# Patient Record
Sex: FEMALE | Race: WHITE | Employment: OTHER | URBAN - METROPOLITAN AREA
[De-identification: names, ages, dates, MRNs, and addresses within clinical notes are randomized per-mention and may not be internally consistent; named-entity substitution may affect disease eponyms.]

---

## 2018-03-20 PROBLEM — F33.9 RECURRENT DEPRESSION (HCC): Status: ACTIVE | Noted: 2018-03-20

## 2018-05-08 ENCOUNTER — HOSPITAL ENCOUNTER (OUTPATIENT)
Dept: CT IMAGING | Age: 58
Discharge: HOME | End: 2018-05-08
Attending: INTERNAL MEDICINE
Payer: COMMERCIAL

## 2018-05-08 DIAGNOSIS — S09.90XD CLOSED HEAD INJURY, SUBSEQUENT ENCOUNTER: ICD-10-CM

## 2018-05-08 PROCEDURE — 70450 CT HEAD/BRAIN W/O DYE: CPT

## 2018-05-24 PROBLEM — G25.81 RLS (RESTLESS LEGS SYNDROME): Status: ACTIVE | Noted: 2018-05-24

## 2018-05-24 PROBLEM — G24.3 CERVICAL DYSTONIA: Status: ACTIVE | Noted: 2018-05-24

## 2018-05-24 PROBLEM — R25.1 TREMOR: Status: ACTIVE | Noted: 2018-05-24

## 2018-05-24 PROBLEM — G47.52 RBD (REM BEHAVIORAL DISORDER): Status: ACTIVE | Noted: 2018-05-24

## 2018-05-30 ENCOUNTER — HOSPITAL ENCOUNTER (OUTPATIENT)
Dept: MAMMOGRAPHY | Age: 58
Discharge: HOME | End: 2018-05-30
Attending: INTERNAL MEDICINE
Payer: COMMERCIAL

## 2018-05-30 DIAGNOSIS — N64.4 BREAST PAIN, RIGHT: ICD-10-CM

## 2018-05-30 PROCEDURE — 76642 ULTRASOUND BREAST LIMITED: CPT

## 2018-05-30 PROCEDURE — 77066 DX MAMMO INCL CAD BI: CPT

## 2020-04-08 PROBLEM — G25.69 TICS OF ORGANIC ORIGIN: Status: ACTIVE | Noted: 2020-04-08

## 2020-05-12 PROBLEM — N61.0 MASTITIS IN FEMALE: Status: ACTIVE | Noted: 2018-08-20

## 2020-05-12 PROBLEM — R52 BODY ACHES: Status: ACTIVE | Noted: 2017-03-26

## 2020-07-10 ENCOUNTER — HOSPITAL ENCOUNTER (OUTPATIENT)
Dept: GENERAL RADIOLOGY | Age: 60
Discharge: HOME | End: 2020-07-10
Attending: INTERNAL MEDICINE
Payer: COMMERCIAL

## 2020-07-10 DIAGNOSIS — R13.19 ESOPHAGEAL DYSPHAGIA: ICD-10-CM

## 2020-07-10 PROCEDURE — 74011000255 HC RX REV CODE- 255: Performed by: INTERNAL MEDICINE

## 2020-07-10 PROCEDURE — 74240 X-RAY XM UPR GI TRC 1CNTRST: CPT

## 2020-07-10 RX ADMIN — BARIUM SULFATE 135 ML: 980 POWDER, FOR SUSPENSION ORAL at 08:50

## 2020-07-10 RX ADMIN — BARIUM SULFATE 355 ML: 0.6 SUSPENSION ORAL at 08:50

## 2021-06-30 ENCOUNTER — APPOINTMENT (OUTPATIENT)
Dept: PHYSICAL THERAPY | Age: 61
End: 2021-06-30
Attending: PSYCHIATRY & NEUROLOGY

## 2021-07-02 ENCOUNTER — HOSPITAL ENCOUNTER (OUTPATIENT)
Dept: MRI IMAGING | Age: 61
Discharge: HOME | End: 2021-07-02
Attending: PSYCHIATRY & NEUROLOGY
Payer: COMMERCIAL

## 2021-07-02 DIAGNOSIS — H93.13 TINNITUS OF BOTH EARS: ICD-10-CM

## 2021-07-02 DIAGNOSIS — R29.2 BABINSKI SIGN POSITIVE IN RIGHT FOOT: ICD-10-CM

## 2021-07-02 DIAGNOSIS — R26.89 IMBALANCE: ICD-10-CM

## 2021-07-02 PROCEDURE — 74011636320 HC RX REV CODE- 636/320: Performed by: PSYCHIATRY & NEUROLOGY

## 2021-07-02 PROCEDURE — A9576 INJ PROHANCE MULTIPACK: HCPCS | Performed by: PSYCHIATRY & NEUROLOGY

## 2021-07-02 PROCEDURE — 70553 MRI BRAIN STEM W/O & W/DYE: CPT

## 2021-07-02 RX ORDER — SODIUM CHLORIDE 0.9 % (FLUSH) 0.9 %
10 SYRINGE (ML) INJECTION
Status: COMPLETED | OUTPATIENT
Start: 2021-07-02 | End: 2021-07-02

## 2021-07-02 RX ADMIN — Medication 10 ML: at 20:03

## 2021-07-02 RX ADMIN — GADOTERIDOL 21 ML: 279.3 INJECTION, SOLUTION INTRAVENOUS at 20:03

## 2021-07-07 ENCOUNTER — HOSPITAL ENCOUNTER (OUTPATIENT)
Dept: PHYSICAL THERAPY | Age: 61
Discharge: HOME | End: 2021-07-07
Attending: PSYCHIATRY & NEUROLOGY
Payer: COMMERCIAL

## 2021-07-07 PROCEDURE — 97162 PT EVAL MOD COMPLEX 30 MIN: CPT

## 2021-07-07 PROCEDURE — 97112 NEUROMUSCULAR REEDUCATION: CPT

## 2021-07-07 NOTE — THERAPY EVALUATION
Addy Estevez  : 1960  Primary: Joaquim Gómez  Secondary:  [address] at [place] [address] [facility], [address] [place] [address],[address], [place] [address].  [phone]   [phone]          Mary Guadalupe Assessment 2021   ICD-10: Treatment Diagnosis:     Dizziness and giddiness (R42)   Other abnormalities of gait and mobility (R26.89)   Precautions/Allergies:   Adhesive tape-silicones and Pcn [penicillins]   TREATMENT PLAN:  Effective Dates: 2021 TO 10/5/2021 (90 days). Frequency/Duration: 2 times a week for 90 Day(s) MEDICAL/REFERRING DIAGNOSIS:  Other abnormalities of gait and mobility [R26.89]   DATE OF ONSET: Chronic  REFERRING PHYSICIAN: Fiona Matthews MD Orders: Evaluate and Treat  Return MD Appointment: TBD     INITIAL ASSESSMENT:  Ms. Eric Mcguire presents with decreased mobility, decreased strength, and decreased safety with ambulation secondary to degenerative changes. After discussing with patient, she agreed she would benefit from physical therapy to improve above deficits. Please sign this plan of treatment if you concur. Thank you for the opportunity to serve this patient. PROBLEM LIST (Impacting functional limitations):  1. Decreased Strength  2. Decreased ADL/Functional Activities  3. Decreased Ambulation Ability/Technique  4. Decreased Balance  5. Decreased Activity Tolerance INTERVENTIONS PLANNED: (Treatment may consist of any combination of the following)  1. Balance Exercise  2. Gait Training  3. Home Exercise Program (HEP)  4. Neuromuscular Re-education/Strengthening  5. Range of Motion (ROM)  6. Therapeutic Activites  7. Therapeutic Exercise/Strengthening     GOALS: (Goals have been discussed and agreed upon with patient.)  Short-Term Functional Goals: Time Frame: 30 days  1. Patient will be independent with home exercise program without exacerbation of symptoms or cueing needed.   Discharge Goals: Time Frame: 90 days  1. Patient will be independent with all ADLs with minimal fear of falling and loss of balance with daily tasks. 2. Patient will report no fear avoidance with social or recreational activities due to fear of falling. 3. Patient will score greater than or equal to 50/56 on Bashir Balance Scale with minimal effect of imbalance on patient's ability to manage every day life activities. 4. Patient will score greater than or equal to 25/30 on Functional Gait Assessment with minimal effect of imbalance on patient's ability to manage every day life activities. OUTCOME MEASURE:   Tool Used: Bashir Balance Scale  Score:  Initial: 45/56 Most Recent: X/56 (Date: -- )   Interpretation of Score: Each section is scored on a 0-4 scale, 0 representing the patients inability to perform the task and 4 representing independence. The scores of each section are added together for a total score of 56. The higher the patients score, the more independent the patient is. Any score below 45 indicates increased risk for falls. Tool Used: Functional Gait Assessment  Score:  Initial: 18/30  Most Recent: X/30 (Date: -- )   Interpretation of Score: This test is a modification of the Dynamic Gait Index. It is a 10 item test with each item score 0-3 that assesses postural stability during various walking tasks. MEDICAL NECESSITY:   · Patient is expected to demonstrate progress in strength, range of motion, balance, coordination and functional technique to improve safety during daily activities. · Patient demonstrates good rehab potential due to higher previous functional level. · Skilled intervention continues to be required due to decreased functional mobility. REASON FOR SERVICES/OTHER COMMENTS:  · Patient continues to demonstrate capacity to improve overall functional mobility which will increase independence and increase safety.   Total Duration:  PT Patient Time In/Time Out  Time In: 1100  Time Out: Taty Camara 148 Potential For Stated Goals: Good  Regarding [de-identified] M Ploof's therapy, I certify that the treatment plan above will be carried out by a therapist or under their direction. Thank you for this referral,  Selene Mishra PT     Referring Physician Signature: Bridgett Reed,* _______________________________ Date _____________      PAIN/SUBJECTIVE:   Initial: Pain Intensity 1: 0  Post Session:  0/10   HISTORY:   History of Injury/Illness (Reason for Referral):  Patient reports she has been having balance issues for a while now. She reports she started out having pain in her leg and groin in her left leg. She reports that the pain is gone now, but she was scared she had had a CVA or has MS. She reports she has not fallen, but has just been off balance with ambulation at times. She reports she can't stand on her left leg to lift her right leg because she doesn't feel it will hold her. She reports that she also has some ringing in her ears that she feels is throwing off her balance as well. She reports she would like to work to improve her overall balance and mobility. Past Medical History/Comorbidities:   Ms. Emelina Marie  has a past medical history of Chronic obstructive pulmonary disease ([facility] [address] Dystonia, H/O gastric bypass (12/9/2013 at [facility]), diabetes mellitus, Liver cirrhosis secondary to JEAN ([facility] [address] Obese, Parkinson's disease ([facility] [address] (dx--2010), RBD (REM behavioral disorder) (5/24/2018), RLS (restless legs syndrome), Thromboembolus ([facility] [address] (2008), Unspecified adverse effect of anesthesia (3/2014 at [facility]), and Unspecified sleep apnea. She also has no past medical history of Difficult intubation, Malignant hyperthermia due to anesthesia, Nausea & vomiting, or Pseudocholinesterase deficiency. Ms. Emelina Marie  has a past surgical history that includes hx lap cholecystectomy (3/2013); hx gastric bypass (12/2013 at [facility]); hx hernia repair (6/2013); and hx other surgical (3/2014 at [facility]).   Social History/Living Environment:   Home Environment: Private residence  Living Alone: No  Support Systems: Family member(s), Friends \ neighbors   Prior Level of Function/Work/Activity:  Independent  Dominant Side:         RIGHT  Personal Factors:          Sex:  female        Age:  63 y.o. Ambulatory/Rehab Services H2 Model Falls Risk Assessment   Risk Factors:       (1)  Dizziness/Vertigo Ability to Rise from Chair:       (0)  Ability to rise in a single movement   Falls Prevention Plan:       No modifications necessary   Total: (5 or greater = High Risk): 1   ©2010 [facility]. All Rights Reserved. [facility] Patent #[id number]. Federal Law prohibits the replication, distribution or use without written permission from [facility] [employer]   Current Medications:       Current Outpatient Medications:     nystatin (MYCOSTATIN) powder, Apply twice daily, Disp: 60 g, Rfl: 2    mupirocin (BACTROBAN) 2 % ointment, Apply  to affected area daily. , Disp: 22 g, Rfl: 0    pantoprazole (Protonix) 40 mg tablet, Take 1 Tab by mouth daily. , Disp: 90 Tab, Rfl: 4    citalopram (CELEXA) 40 mg tablet, Take 1 Tab by mouth daily. , Disp: 90 Tab, Rfl: 1    guaiFENesin-dextromethorphan SR (Mucinex DM) 600-30 mg per tablet, Take 1 Tab by mouth two (2) times a day., Disp: 60 Tab, Rfl: 1    ipratropium-albuterol (COMBIVENT RESPIMAT)  mcg/actuation inhaler, Take 1 Puff by inhalation four (4) times daily. , Disp: 1 Inhaler, Rfl: 6   Date Last Reviewed:  7/7/2021    Number of Personal Factors/Comorbidities that affect the Plan of Care: 1-2: MODERATE COMPLEXITY   EXAMINATION:   Observation/Orthostatic Postural Assessment:          Posture Assessment: Rounded shoulders, Forward head   Functional Mobility:   Gait/Mobility:    · Base of Support: Center of gravity altered  · Speed/Jammie: Pace decreased (<100 feet/min)  · Step Length: Left shortened, Right shortened  · Swing Pattern: Left asymmetrical, Right asymmetrical  · Gait Abnormalities: Antalgic  · Ambulation - Level of Assistance: Independent  · Interventions: Verbal cues, Safety awareness training          Transfers:     · Sit to Stand: Modified independent  · Stand to Sit: Modified independent  · Stand Pivot Transfers: Modified independent  · Bed to Chair: Modified independent  · Lateral Transfers: Modified independent         Bed Mobility:     · Rolling: Independent  · Supine to Sit: Independent  · Sit to Supine: Independent  · Scooting: Independent       Strength:          UE STRENGTH: 4/5 shoulder flexion, 4/5 shoulder abduction, 4/5 shoulder extension, 4/5 elbow flexion, 4/5 elbow extension. LE STRENGTH:  3+/5 hip flexion, 3+/5 hip abduction, 3+/5 hip adduction, 3+/5 hip extension, 3+/5 knee extension, 3+/5 knee flexion, 2/5 ankle dorsiflexion, 2/5 ankle plantarflexion, 2/5 ankle inversion, 2/5 ankle eversion. Sensation:         Intact     Body Structures Involved:  1. Nerves  2. Muscles Body Functions Affected:  1. Neuromusculoskeletal  2. Movement Related Activities and Participation Affected:  1. Mobility  2.  Self Care   Number of elements (examined above) that affect the Plan of Care: 4+: HIGH COMPLEXITY   CLINICAL PRESENTATION:   Presentation: Evolving clinical presentation with changing clinical characteristics: MODERATE COMPLEXITY   CLINICAL DECISION MAKING:   Use of outcome tool(s) and clinical judgement create a POC that gives a: Questionable prediction of patient's progress: MODERATE COMPLEXITY

## 2021-07-07 NOTE — PROGRESS NOTES
Rloand Estevez  : 1960  Primary: [facility] [place] [facility]  Secondary:  [address] at [facility]  [address] [facility], Melani Narvaez, [place] [address] [place].  [phone]   [phone]        OUTPATIENT PHYSICAL THERAPY: Daily Treatment Note 2021  Visit Count:  1    ICD-10: Treatment Diagnosis:     Dizziness and giddiness (R42)   Other abnormalities of gait and mobility (R26.89)   Precautions/Allergies:   Adhesive tape-silicones and Pcn [penicillins]   TREATMENT PLAN:  Effective Dates: 2021 TO 10/5/2021 (90 days). Frequency/Duration: 2 times a week for 90 Day(s)    Pre-treatment Symptoms/Complaints:  2021: Patient reports she would like to no be so off balance. Pain: Initial: Pain Intensity 1: 0  Post Session:  0/10   Medications Last Reviewed:  2021  Updated Objective Findings:  See evaluation note from today  TREATMENT:     NEUROMUSCULAR RE-EDUCATION: (15 minutes):  Exercise/activities per grid below to improve balance, coordination, kinesthetic sense, posture and proprioception. Required minimal visual and verbal cues to promote static and dynamic balance in standing. Date:  2021   Activity/Exercise Parameters   Marching in hallway 4 laps  HEP   Side stepping in hallway 4 laps  HEP      Treatment/Session Summary:    · Response to Treatment:  Patient tolerated assessment without complaints of increased imbalance or fatigue. Patient verbalized and demonstrated understanding of HEP. · Communication/Consultation:  None today  · Equipment provided today:  None today  · Recommendations/Intent for next treatment session: Next visit will focus on improving overall mobility.     Total Treatment Billable Duration:  15 minutes + evaluation  PT Patient Time In/Time Out  Time In: 1100  Time Out: [facility], PT    Future Appointments   Date Time Provider Gladis Jackson   2021 10:15 AM Lillian Chavarria PT [facility] SFE   2021  9:30 AM Pérez Mulligan, PT [facility]PT [facility]   9/8/2021  2:30 PM Inez Pink MD BSNE BSNE

## 2021-07-12 ENCOUNTER — HOSPITAL ENCOUNTER (OUTPATIENT)
Dept: PHYSICAL THERAPY | Age: 61
Discharge: HOME | End: 2021-07-12
Attending: PSYCHIATRY & NEUROLOGY
Payer: COMMERCIAL

## 2021-07-12 PROCEDURE — 97112 NEUROMUSCULAR REEDUCATION: CPT

## 2021-07-12 NOTE — PROGRESS NOTES
John Paul Lund Zenaida  : 1960  Primary: Shin Gómez  Secondary:  [address] at [place] [address] [facility], [address] [place] [address],[address], [place] [address].  [phone]   [phone]        OUTPATIENT PHYSICAL THERAPY: Daily Treatment Note 2021  Visit Count:  2    ICD-10: Treatment Diagnosis:     Dizziness and giddiness (R42)   Other abnormalities of gait and mobility (R26.89)   Precautions/Allergies:   Adhesive tape-silicones and Pcn [penicillins]   TREATMENT PLAN:  Effective Dates: 2021 TO 10/5/2021 (90 days). Frequency/Duration: 2 times a week for 90 Day(s)    Pre-treatment Symptoms/Complaints:  2021: Patient reports she is doing okay. Pain: Initial: Pain Intensity 1: 0  Post Session:  0/10   Medications Last Reviewed:  2021  Updated Objective Findings:  None Today  TREATMENT:     NEUROMUSCULAR RE-EDUCATION: (45 minutes):  Exercise/activities per grid below to improve balance, coordination, kinesthetic sense, posture and proprioception. Required minimal visual and verbal cues to promote static and dynamic balance in standing.     Balance/Vestibular Treatment:   Activity   Date  21 Date Date Date Date Date   Activity/Exercise   Sets/reps/equipment Sets/reps/  equipment Sets/reps/  equipment Sets/reps/  equipment Sets/reps/  equipment Sets/reps/  equipment   Walking with head turns     4 laps        Walking with head up & down     4 laps        Step ups             Step taps     6 inch 15 reps bilateral         Marching   4 laps        Sidestepping   4 laps        Crossovers           [place]           Walking  backwards             Tandem walking   4 laps        Weaving in/out of cones     4 laps        Picking up cones             Stepping over half foam   Forward 10 reps bilateral         Ball toss           Kick ball           Figure 8s            Circles right/left           Walking with 360 degree turns           Spirals           Weight shifting:    Left & Right             Weight shifting: Forward & Backward              Static Standing Balance     [place] eyes open  and eyes closed        Standing with feet apart             Standing with feet together     Eyes open and eyes closed        Standing with feet semitandem   Blue foam eyes open        Standing with feet tandem           Single leg stance           X1/X2 Viewing exercises             Hallpike-Kalpesh testing for BPPV (Benign Paroxysmal Positional Vertigo)             Lange-Daroff exercises           Canalith Repositioning treatment/Epley Maneuver  for BPPV (Benign Paroxysmal Positional Vertigo)           Smart Equitest Training: See scanned report. Treatment/Session Summary:    · Response to Treatment:  Patient needed several rest breaks due to increased fatigue. Added standing feet together eyes closed to home exercise program.  · Communication/Consultation:  None today  · Equipment provided today:  None today  · Recommendations/Intent for next treatment session: Next visit will focus on improving overall mobility.     Total Treatment Billable Duration:  45 minutes   PT Patient Time In/Time Out  Time In: 1015  Time Out: [address], PT    Future Appointments   Date Time Provider Gladis Jackson   7/12/2021  2:00 PM [facility]   7/14/2021  9:30 AM Zac Brar PT SFE[facility]PT SFE   7/20/2021  8:45 AM Rudi Dumas, PT SFEORPT SFE   7/23/2021 11:00 AM Zac Brar PT SFEORPT SFE   9/8/2021  2:30 PM Suman Kwok MD BSNE BSNE

## 2021-07-14 ENCOUNTER — HOSPITAL ENCOUNTER (OUTPATIENT)
Dept: PHYSICAL THERAPY | Age: 61
Discharge: HOME | End: 2021-07-14
Attending: PSYCHIATRY & NEUROLOGY
Payer: COMMERCIAL

## 2021-07-14 PROCEDURE — 97112 NEUROMUSCULAR REEDUCATION: CPT

## 2021-07-14 NOTE — PROGRESS NOTES
Osborn Castleman Ploof  : 1960  Primary: [facility]  Secondary:  [address] at [place] [address] [facility], [address] [place] [address],[address], [facility].  [phone]   [phone]        OUTPATIENT PHYSICAL THERAPY: Daily Treatment Note 2021  Visit Count:  3    ICD-10: Treatment Diagnosis:     Dizziness and giddiness (R42)   Other abnormalities of gait and mobility (R26.89)   Precautions/Allergies:   Adhesive tape-silicones and Pcn [penicillins]   TREATMENT PLAN:  Effective Dates: 2021 TO 10/5/2021 (90 days). Frequency/Duration: 2 times a week for 90 Day(s)    Pre-treatment Symptoms/Complaints:  2021: Patient reports she is tired of feeling this way. Pain: Initial: Pain Intensity 1: 0  Post Session:  0/10   Medications Last Reviewed:  2021  Updated Objective Findings:  None Today  TREATMENT:     NEUROMUSCULAR RE-EDUCATION: (30 minutes):  Exercise/activities per grid below to improve balance, coordination, kinesthetic sense, posture and proprioception. Required minimal visual and verbal cues to promote static and dynamic balance in standing.     Balance/Vestibular Treatment:   Activity   Date  2021   Activity/Exercise   Sets/reps/equipment   Hallpike-Kalpesh Positive on Right   Epley Maneuver 1 bout with head turned right in starting position - nystagmus noted in all three positions - patient could not tolerate treatment again today   - verbal instruction given to rest today in an upright position   Walking with head turns     Not today:  4 laps   Walking with head up & down     Not today:  4 laps   Step ups        Step taps     Not today:  6 inch   15 reps   Marching   Not today:  4 laps   Sidestepping   Not today:  4 laps   Crossovers      [place]      Walking  backwards        Tandem walking   Not today:  4 laps   Weaving in/out of cones     Not today:  4 laps   Picking up cones        Stepping over half foam   Not today:  10 reps forward  B LE   Ball toss Kick ball      Figure 8s       Circles right/left      Walking with 360 degree turns      Spirals      Weight shifting:    Left & Right        Weight shifting: Forward & Backward         Static Standing Balance     Not today:  [place] eyes open  and eyes closed   Standing with feet apart        Standing with feet together     Not today:  Eyes open and eyes closed   Standing with feet semitandem   Not today:  Blue foam eyes open   Standing with feet tandem      Single leg stance          Treatment/Session Summary:    · Response to Treatment:  Patient tolerated Hallpike-Nedrow and Epley maneuver with reports of nausea with sympathetic nervous system response of increased body temperature. Only one bout of Epley attempted today per patients symptoms as well as request.  Will re-assess next visit. · Communication/Consultation:  None today  · Equipment provided today:  None today  · Recommendations/Intent for next treatment session: Next visit will focus on improving overall mobility.     Total Treatment Billable Duration:  45 minutes   PT Patient Time In/Time Out  Time In: 0930  Time Out: [address], PT    Future Appointments   Date Time Provider Gladis Jackson   7/20/2021  8:45 AM Isa Duong, PT [facility] SFE   7/23/2021 11:00 AM Angel Yun, PT [facility] SFE   9/8/2021  2:30 PM Roswell Ormond, MD [facility] BSNE

## 2021-07-20 ENCOUNTER — HOSPITAL ENCOUNTER (OUTPATIENT)
Dept: PHYSICAL THERAPY | Age: 61
Discharge: HOME | End: 2021-07-20
Attending: PSYCHIATRY & NEUROLOGY
Payer: COMMERCIAL

## 2021-07-20 PROCEDURE — 97112 NEUROMUSCULAR REEDUCATION: CPT

## 2021-07-20 NOTE — PROGRESS NOTES
Rajeshpaul Arshadoof  : 1960  Primary: [facility]  Secondary:  [address] at [place] [id number] [facility], [address] [place] [address],[address], [place] [address].  [phone]   [phone]        OUTPATIENT PHYSICAL THERAPY: Daily Treatment Note 2021  Visit Count:  4    ICD-10: Treatment Diagnosis:     Dizziness and giddiness (R42)   Other abnormalities of gait and mobility (R26.89)   Precautions/Allergies:   Adhesive tape-silicones and Pcn [penicillins]   TREATMENT PLAN:  Effective Dates: 2021 TO 10/5/2021 (90 days). Frequency/Duration: 2 times a week for 90 Day(s)    Pre-treatment Symptoms/Complaints:  2021: \"I think I overdid it yesterday with my friend. We went out to eat and shopping. I am feeling really tired today\". Pain: Initial: Pain Intensity 1: 0  Post Session:  0/10   Medications Last Reviewed:  2021  Updated Objective Findings:  None Today  TREATMENT:     NEUROMUSCULAR RE-EDUCATION: (45 minutes):  Exercise/activities per grid below to improve balance, coordination, kinesthetic sense, posture and proprioception. Required minimal visual and verbal cues to promote static and dynamic balance in standing. Balance/Vestibular Treatment:   Activity   Date  2021   Activity/Exercise   Sets/reps/equipment   Hallpike-Kalpesh X   Epley Maneuver X   Walking with head turns     4 laps   Walking with head up & down     4 laps   Step ups        Step taps     6 inch   2x10 reps   Marching   4 laps   Sidestepping   4 laps   Crossovers      [place]      Walking  backwards        Tandem walking   4 laps   Weaving in/out of cones     4 laps   Picking up cones        Stepping over half foam   5 reps forward  B LE   Ball toss      Kick ball      Figure 8s       Circles right/left      Walking with 360 degree turns      Spirals      Weight shifting:    Left & Right        Weight shifting:   Forward & Backward         Static Standing Balance     X   Standing with feet apart Standing with feet together     X   Standing with feet semitandem   X   Standing with feet tandem      Single leg stance          Treatment/Session Summary:    · Response to Treatment:  Patient did not want to be retested for BPPV. Patient was told  we would retest later if vertigo returns. Patient needed extended rest breaks due to increased fatigue. · Communication/Consultation:  None today  · Equipment provided today:  None today  · Recommendations/Intent for next treatment session: Next visit will focus on improving overall mobility.     Total Treatment Billable Duration:  45 minutes   PT Patient Time In/Time Out  Time In: 0845  Time Out: [address] PT    Future Appointments   Date Time Provider Gladis Jackson   7/23/2021 11:00 AM Roxanna Barajas, PT [facility] SFE   7/27/2021  9:30 AM Chitra Dumas, PT SFEORPT SFE   7/29/2021  2:30 PM Roxanna Barajas, PT SFE[facility]PT SFE   9/8/2021  2:30 PM Garry Car MD [facility] BSNE

## 2021-07-23 ENCOUNTER — HOSPITAL ENCOUNTER (OUTPATIENT)
Dept: PHYSICAL THERAPY | Age: 61
Discharge: HOME | End: 2021-07-23
Attending: PSYCHIATRY & NEUROLOGY
Payer: COMMERCIAL

## 2021-07-23 PROCEDURE — 97112 NEUROMUSCULAR REEDUCATION: CPT

## 2021-07-23 NOTE — PROGRESS NOTES
Lachelle Estevez  : 1960  Primary: [facility]  Secondary:  [address] at [place] [address] [facility], [address] [place] [address],[address], [place] [address].  [phone]   [phone]        OUTPATIENT PHYSICAL THERAPY: Daily Treatment Note 2021  Visit Count:  5    ICD-10: Treatment Diagnosis:     Dizziness and giddiness (R42)   Other abnormalities of gait and mobility (R26.89)   Precautions/Allergies:   Adhesive tape-silicones and Pcn [penicillins]   TREATMENT PLAN:  Effective Dates: 2021 TO 10/5/2021 (90 days). Frequency/Duration: 2 times a week for 90 Day(s)    Pre-treatment Symptoms/Complaints:  2021: Patient reports she is dizzy today and tired, but not having any of the spinning. Pain: Initial: Pain Intensity 1: 0  Post Session:  0/10   Medications Last Reviewed:  2021  Updated Objective Findings:  None Today  TREATMENT:     NEUROMUSCULAR RE-EDUCATION: (45 minutes):  Exercise/activities per grid below to improve balance, coordination, kinesthetic sense, posture and proprioception. Required minimal visual and verbal cues to promote static and dynamic balance in standing. Balance/Vestibular Treatment:   Activity   Date  2021   Activity/Exercise   Sets/reps/equipment   Hallpike-Kalpesh X   Epley Maneuver X   Walking with head turns     4 laps   Walking with head up & down     4 laps   Step ups        Step taps     6 inch   2x10 reps   Marching   4 laps   Sidestepping   4 laps   Crossovers      [place]      Walking  backwards        Tandem walking   4 laps   Weaving in/out of cones     4 laps   Picking up cones        Stepping over half foam   10 reps forward  B LE   Ball toss      Kick ball      Figure 8s       Circles right/left      Walking with 360 degree turns      Spirals      Weight shifting:    Left & Right        Weight shifting:   Forward & Backward         Static Standing Balance     X   Standing with feet apart        Standing with feet together Level ground  5 seconds eyes closed  3 reps   Standing with feet semitandem   Level ground  5 seconds eyes closed  3 reps each direction   Standing with feet tandem      Single leg stance          Treatment/Session Summary:    · Response to Treatment:  Patient tolerated treatment well with improving overall mobility, but with several sitting rest breaks due to dizziness and/or fatigue. · Communication/Consultation:  None today  · Equipment provided today:  None today  · Recommendations/Intent for next treatment session: Next visit will focus on improving overall mobility.     Total Treatment Billable Duration:  45 minutes   PT Patient Time In/Time Out  Time In: 1100  Time Out: [facility], PT    Future Appointments   Date Time Provider Gladis Jackson   7/23/2021 11:00 AM Shabnam Loredo PT [facility] BLANCO   7/27/2021  9:30 AM Juan Dumas PT [facility]   7/29/2021  2:30 PM Shabnam Loredo, PT SFEORP[facility]OR[facility] SFE   9/8/2021  2:30 PM Breann Anaya MD [facility] BSNE

## 2021-07-27 ENCOUNTER — HOSPITAL ENCOUNTER (OUTPATIENT)
Dept: PHYSICAL THERAPY | Age: 61
Discharge: HOME | End: 2021-07-27
Attending: PSYCHIATRY & NEUROLOGY
Payer: COMMERCIAL

## 2021-07-27 PROCEDURE — 97112 NEUROMUSCULAR REEDUCATION: CPT

## 2021-07-27 NOTE — PROGRESS NOTES
Osborn Castleman Ploof  : 1960  Primary: [facility]  Secondary:  [address] at [address], [address] [place] [address],[address], [place] [address].  [phone]   [phone]        OUTPATIENT PHYSICAL THERAPY: Daily Treatment Note 2021  Visit Count:  6    ICD-10: Treatment Diagnosis:     Dizziness and giddiness (R42)   Other abnormalities of gait and mobility (R26.89)   Precautions/Allergies:   Adhesive tape-silicones and Pcn [penicillins]   TREATMENT PLAN:  Effective Dates: 2021 TO 10/5/2021 (90 days). Frequency/Duration: 2 times a week for 90 Day(s)    Pre-treatment Symptoms/Complaints:  2021: Patient reports  was the worst day. \"I had a horrible headache\". Patient reports no dizziness right now. Pain: Initial: Pain Intensity 1: 3  Pain Location 1: Head, Neck  Post Session:  3/10   Medications Last Reviewed:  2021  Updated Objective Findings:  None Today  TREATMENT:     NEUROMUSCULAR RE-EDUCATION: (45 minutes):  Exercise/activities per grid below to improve balance, coordination, kinesthetic sense, posture and proprioception. Required minimal visual and verbal cues to promote static and dynamic balance in standing. Balance/Vestibular Treatment:   Activity   Date  2021   Activity/Exercise   Sets/reps/equipment   Hallpike-Bruni X   Epley Maneuver X   Walking with head turns     4 laps   Walking with head up & down     4 laps   Step ups        Step taps     6 inch   2x10 reps   Marching   4 laps   Sidestepping   4 laps   Crossovers      [place]      Walking  backwards        Tandem walking   4 laps   Weaving in/out of cones     4 laps   Picking up cones        Stepping over half foam   10 reps forward  B LE  10 reps lateral B LE   Ball toss      Kick ball      Figure 8s       Circles right/left      Walking with 360 degree turns      Spirals      Weight shifting:    Left & Right        Weight shifting:   Forward & Backward         Static Standing Balance     X   Standing with feet apart        Standing with feet together     Level ground  5 seconds eyes closed  3 reps   Standing with feet semitandem   Level ground  5 seconds eyes closed  3 reps each direction   Standing with feet tandem      Single leg stance          Treatment/Session Summary:    · Response to Treatment:  Patient reports increased dizziness walking with head turns. Patient needed several rest breaks due to dizziness. · Communication/Consultation:  None today  · Equipment provided today:  None today  · Recommendations/Intent for next treatment session: Next visit will focus on improving overall mobility.     Total Treatment Billable Duration:  45 minutes   PT Patient Time In/Time Out  Time In: 0930  Time Out: Reynaldo De La Cruz, PT    Future Appointments   Date Time Provider Gladis Jackson   7/29/2021  2:30 PM Almon Sample, PT [facility] SFE   8/3/2021  2:30 PM Almon Sample, PT S[facility]PT SFE   8/5/2021  4:45 PM Almon Sample, PT [facility] SFE   8/10/2021 11:00 AM Almon Sample, PT [facility] SFE   8/17/2021 11:00 AM Almon Sample, PT [facility] SFE   8/24/2021 11:00 AM Almon Sample, PT [facility] SFE   8/26/2021 10:15 AM Almon Sample, PT [facility] SFE   8/31/2021 11:00 AM Almon Sample, PT S[facility]PT SFE   9/8/2021  2:30 PM Inez Pink MD BSNE BSNE

## 2021-07-29 ENCOUNTER — HOSPITAL ENCOUNTER (OUTPATIENT)
Dept: PHYSICAL THERAPY | Age: 61
Discharge: HOME | End: 2021-07-29
Attending: PSYCHIATRY & NEUROLOGY
Payer: COMMERCIAL

## 2021-07-29 PROCEDURE — 97112 NEUROMUSCULAR REEDUCATION: CPT

## 2021-07-29 NOTE — PROGRESS NOTES
Elizabeth Jara Ploof  : 1960  Primary: [facility]  Secondary:  [address] at [place] [address] [facility], [address] [place] [address],[address], [facility].  [phone]   [phone]        OUTPATIENT PHYSICAL THERAPY: Daily Treatment Note 2021  Visit Count:  7    ICD-10: Treatment Diagnosis:     Dizziness and giddiness (R42)   Other abnormalities of gait and mobility (R26.89)   Precautions/Allergies:   Adhesive tape-silicones and Pcn [penicillins]   TREATMENT PLAN:  Effective Dates: 2021 TO 10/5/2021 (90 days). Frequency/Duration: 2 times a week for 90 Day(s)    Pre-treatment Symptoms/Complaints:  2021: Patient reports she is feeling good today. Pain: Initial: Pain Intensity 1: 0  Post Session:  3/10   Medications Last Reviewed:  2021  Updated Objective Findings:  None Today  TREATMENT:     NEUROMUSCULAR RE-EDUCATION: (45 minutes):  Exercise/activities per grid below to improve balance, coordination, kinesthetic sense, posture and proprioception. Required minimal visual and verbal cues to promote static and dynamic balance in standing. Balance/Vestibular Treatment:   Activity   Date  2021   Activity/Exercise   Sets/reps/equipment   Hallpike-N[place]ack X   Epley Maneuver X   Walking with head turns     4 laps   Walking with head up & down     4 laps   Step ups        Step taps     6 inch   2x10 reps   Marching   4 laps   Sidestepping   4 laps   Crossovers      [place]      Walking  backwards        Tandem walking   4 laps   Weaving in/out of cones     4 laps   Picking up cones        Stepping over half foam   10 reps forward  B LE  10 reps lateral B LE   Ball toss      Kick ball      Figure 8s       Circles right/left      Walking with 360 degree turns      Spirals      Weight shifting:    Left & Right        Weight shifting:   Forward & Backward         Static Standing Balance     X   Standing with feet apart        Standing with feet together     Level ground  5 seconds eyes closed  3 reps   Standing with feet semitandem   Level ground  5 seconds eyes closed  3 reps each direction   Standing with feet tandem      Single leg stance          Treatment/Session Summary:    · Response to Treatment:  Patient tolerated treatment well with improved mobility and balance with activities today. · Communication/Consultation:  None today  · Equipment provided today:  None today  · Recommendations/Intent for next treatment session: Next visit will focus on improving overall mobility.     Total Treatment Billable Duration:  45 minutes   PT Patient Time In/Time Out  Time In: 1430  Time Out: [address] [facility], PT    Future Appointments   Date Time Provider Gladis Jackson   7/29/2021  2:30 PM Angel Yun, PT [facility] SFE   8/3/2021  2:30 PM Angel Yun, PT S[facility]PT SFE   8/5/2021  4:45 PM Angel Yun, PT [facility]PT SFE   8/10/2021 11:00 AM Angel Yun, PT [facility] SFE   8/17/2021 11:00 AM Angel Yun, PT [facility] SFE   8/24/2021 11:00 AM Angel Court, PT [facility]PT SFE   8/26/2021 10:15 AM Angel Yun, PT [facility]PT SFE   8/31/2021 11:00 AM Angel Court, PT S[facility]PT SFE   9/8/2021  2:30 PM Roswell Ormond, MD BSNE BSNE

## 2021-08-03 ENCOUNTER — HOSPITAL ENCOUNTER (OUTPATIENT)
Dept: PHYSICAL THERAPY | Age: 61
Discharge: HOME | End: 2021-08-03
Attending: PSYCHIATRY & NEUROLOGY
Payer: COMMERCIAL

## 2021-08-03 PROCEDURE — 97112 NEUROMUSCULAR REEDUCATION: CPT

## 2021-08-05 ENCOUNTER — HOSPITAL ENCOUNTER (OUTPATIENT)
Dept: PHYSICAL THERAPY | Age: 61
Discharge: HOME | End: 2021-08-05
Attending: PSYCHIATRY & NEUROLOGY
Payer: COMMERCIAL

## 2021-08-05 PROCEDURE — 97112 NEUROMUSCULAR REEDUCATION: CPT

## 2021-08-05 NOTE — PROGRESS NOTES
Tito Galan Ploof  : 1960  Primary: Isabel Dorsey [facility]  Secondary:  [address] at [place] [address] [facility], [address] [place] [address],[address], [address] [facility]  [phone]   [phone]        OUTPATIENT PHYSICAL THERAPY: Daily Treatment Note 2021  Visit Count:  9    ICD-10: Treatment Diagnosis:     Dizziness and giddiness (R42)   Other abnormalities of gait and mobility (R26.89)   Precautions/Allergies:   Adhesive tape-silicones and Pcn [penicillins]   TREATMENT PLAN:  Effective Dates: 2021 TO 10/5/2021 (90 days). Frequency/Duration: 2 times a week for 90 Day(s)    Pre-treatment Symptoms/Complaints:  2021: Patient reports she is ok right now, but is sometimes dizzy after treatment sessions. Pain: Initial: Pain Intensity 1: 0  Post Session:  0/10   Medications Last Reviewed:  2021  Updated Objective Findings:  None Today  TREATMENT:     NEUROMUSCULAR RE-EDUCATION: (45 minutes):  Exercise/activities per grid below to improve balance, coordination, kinesthetic sense, posture and proprioception. Required minimal visual and verbal cues to promote static and dynamic balance in standing. Balance/Vestibular Treatment:   Activity   Date  2021   Activity/Exercise   Sets/reps/equipment   Walking with head turns     4 laps   Walking with head up & down     4 laps   Step ups        Step taps     6 inch   2x10 reps   Marching   4 laps   Sidestepping   4 laps   Crossovers      [place]      Walking  backwards        Tandem walking   4 laps   Weaving in/out of cones     4 laps   Picking up cones        Stepping over half foam   10 reps forward  B LE  10 reps lateral B LE   Ball toss      Kick ball      Figure 8s       Circles right/left      Walking with 360 degree turns      Spirals      Weight shifting:    Left & Right        Weight shifting:   Forward & Backward         Static Standing Balance     X   Standing with feet apart        Standing with feet together     Blue foams  5 seconds eyes closed  3 reps   Standing with feet semitandem   Level ground  5 seconds eyes closed  3 reps each direction   Standing with feet tandem      Single leg stance          Treatment/Session Summary:    · Response to Treatment:  Patient tolerated treatment well with improving overall mobility and balance. · Communication/Consultation:  None today  · Equipment provided today:  None today  · Recommendations/Intent for next treatment session: Next visit will focus on improving overall mobility.     Total Treatment Billable Duration:  45 minutes   PT Patient Time In/Time Out  Time In: 8125  Time Out: [address], PT    Future Appointments   Date Time Provider Gladis Jackson   8/5/2021  4:45 PM Jasmyne Patiño, PT [facility] SFE   8/10/2021 11:00 AM Jasmyne Patiño, PT S[facility]PT SFE   8/18/2021  9:30 AM Jasmyne Patiño, PT S[facility]PT SFE   8/24/2021 11:00 AM Jasmyne Patiño, PT S[facility]PT SFE   8/26/2021 10:15 AM Jasmyne Patiño, PT [facility]PT SFE   8/31/2021 11:00 AM Jasmyne Patiño, PT SFEORPT SFE   9/8/2021  2:30 PM Julissa Alanis MD BSNE BSNE

## 2021-08-10 ENCOUNTER — HOSPITAL ENCOUNTER (OUTPATIENT)
Dept: PHYSICAL THERAPY | Age: 61
Discharge: HOME | End: 2021-08-10
Attending: PSYCHIATRY & NEUROLOGY
Payer: COMMERCIAL

## 2021-08-10 PROCEDURE — 97112 NEUROMUSCULAR REEDUCATION: CPT

## 2021-08-10 NOTE — PROGRESS NOTES
Alicia Bonnie Estevez  : 1960  Primary: [facility]  Secondary:  [address] at [facility]  [address], [address], [address] [facility]  [phone]   [phone]        OUTPATIENT PHYSICAL THERAPY: Daily Treatment Note 8/10/2021  Visit Count:  10    ICD-10: Treatment Diagnosis:     Dizziness and giddiness (R42)   Other abnormalities of gait and mobility (R26.89)   Precautions/Allergies:   Adhesive tape-silicones and Pcn [penicillins]   TREATMENT PLAN:  Effective Dates: 2021 TO 10/5/2021 (90 days). Frequency/Duration: 2 times a week for 90 Day(s)    Pre-treatment Symptoms/Complaints:  8/10/2021: Patient reports she is ok today, just tired. Pain: Initial: Pain Intensity 1: 0  Post Session:  0/10   Medications Last Reviewed:  8/10/2021  Updated Objective Findings:  None Today  TREATMENT:     NEUROMUSCULAR RE-EDUCATION: (45 minutes):  Exercise/activities per grid below to improve balance, coordination, kinesthetic sense, posture and proprioception. Required minimal visual and verbal cues to promote static and dynamic balance in standing. Balance/Vestibular Treatment:   Activity   Date  8/10/2021   Activity/Exercise   Sets/reps/equipment   Walking with head turns     4 laps   Walking with head up & down     4 laps   Step ups        Step taps     6 inch   2x10 reps   Marching   4 laps   Sidestepping   4 laps   Crossovers      [place]      Walking  backwards        Tandem walking   4 laps   Weaving in/out of cones     4 laps   Picking up cones        Stepping over half foam   10 reps forward  B LE  10 reps lateral B LE   Ball toss      Kick ball      Figure 8s       Circles right/left      Walking with 360 degree turns      Spirals      Weight shifting:    Left & Right        Weight shifting:   Forward & Backward         Static Standing Balance     X   Standing with feet apart        Standing with feet together     Blue foams  5 seconds eyes closed  3 reps Standing with feet semitandem   Level ground  5 seconds eyes closed  3 reps each direction   Standing with feet tandem      Single leg stance          Treatment/Session Summary:    · Response to Treatment:  Patient tolerated treatment well with improving overall mobility and balance. · Communication/Consultation:  None today  · Equipment provided today:  None today  · Recommendations/Intent for next treatment session: Next visit will focus on improving overall mobility.     Total Treatment Billable Duration:  45 minutes   PT Patient Time In/Time Out  Time In: 1100  Time Out: 503 [facility], PT    Future Appointments   Date Time Provider Gladis Jackson   8/10/2021 11:00 AM Marcocabrera Najera, PT [facility] SFE   8/18/2021  9:30 AM Marco Reinaldo, PT [facility]PT SFE   8/24/2021 11:00 AM Marco Reinaldo, PT S[facility]PT SFE   8/26/2021 10:15 AM Marco Reinaldo, PT S[facility]PT SFE   8/31/2021 11:00 AM Marco Reinaldo, PT SFEORPT SFE   9/8/2021  2:30 PM Yuki Talavera MD BSNE BSNE

## 2021-08-10 NOTE — PROGRESS NOTES
Vivi Estevez  : 1960  Primary: Kari Gómez  Secondary:  [address] at [facility]  [address], [address] [place] [address],[address], [facility] [address]  [phone]   [phone]          OUTPATIENT PHYSICAL THERAPY:Progress Report 8/10/2021   ICD-10: Treatment Diagnosis:     Dizziness and giddiness (R42)   Other abnormalities of gait and mobility (R26.89)   Precautions/Allergies:   Adhesive tape-silicones and Pcn [penicillins]   TREATMENT PLAN:  Effective Dates: 2021 TO 10/5/2021 (90 days). Frequency/Duration: 2 times a week for 90 Day(s) MEDICAL/REFERRING DIAGNOSIS:  Other abnormalities of gait and mobility [R26.89]   DATE OF ONSET: Chronic  REFERRING PHYSICIAN: Sana Matthews MD Orders: Evaluate and Treat  Return MD Appointment: TBD     ASSESSMENT:  Ms. Casper Quick presents with improving mobility, strength, and safety with ambulation since initial evaluation. Patient has attended a total of 10 scheduled physical therapy visits including initial evaluation on 2021. Treatment has consisted of mobility and balance activities to improve overall safety and performance with activities of daily living. Patient is making steady progress with all aspects of therapy at this time. PROBLEM LIST (Impacting functional limitations):  1. Decreased Strength  2. Decreased ADL/Functional Activities  3. Decreased Ambulation Ability/Technique  4. Decreased Balance  5. Decreased Activity Tolerance INTERVENTIONS PLANNED: (Treatment may consist of any combination of the following)  1. Balance Exercise  2. Gait Training  3. Home Exercise Program (HEP)  4. Neuromuscular Re-education/Strengthening  5. Range of Motion (ROM)  6. Therapeutic Activites  7. Therapeutic Exercise/Strengthening     GOALS: (Goals have been discussed and agreed upon with patient.)  Short-Term Functional Goals: Time Frame: 30 days  1.  Patient will be independent with home exercise program without exacerbation of symptoms or cueing needed--goal met. Discharge Goals: Time Frame: 90 days  1. Patient will be independent with all ADLs with minimal fear of falling and loss of balance with daily tasks--goal ongoing (stairs). 2. Patient will report no fear avoidance with social or recreational activities due to fear of falling--goal ongoing. 3. Patient will score greater than or equal to 50/56 on Bashir Balance Scale with minimal effect of imbalance on patient's ability to manage every day life activities--goal ongoing. 4. Patient will score greater than or equal to 25/30 on Functional Gait Assessment with minimal effect of imbalance on patient's ability to manage every day life activities--goal ongoing. OUTCOME MEASURE:   Tool Used: Bashir Balance Scale  Score:  Initial: 45/56 Most Recent: 46/56 (Date: 8/10/2021 )   Interpretation of Score: Each section is scored on a 0-4 scale, 0 representing the patients inability to perform the task and 4 representing independence. The scores of each section are added together for a total score of 56. The higher the patients score, the more independent the patient is. Any score below 45 indicates increased risk for falls. Tool Used: Functional Gait Assessment  Score:  Initial: 18/30  Most Recent: 20/30 (Date: 8/10/2021 )   Interpretation of Score: This test is a modification of the Dynamic Gait Index. It is a 10 item test with each item score 0-3 that assesses postural stability during various walking tasks. MEDICAL NECESSITY:   · Patient is expected to demonstrate progress in strength, range of motion, balance, coordination and functional technique to improve safety during daily activities. · Patient demonstrates good rehab potential due to higher previous functional level. · Skilled intervention continues to be required due to decreased functional mobility.   REASON FOR SERVICES/OTHER COMMENTS:  · Patient continues to demonstrate capacity to improve overall functional mobility which will increase independence and increase safety. Total Duration:  PT Patient Time In/Time Out  Time In: 1100  Time Out: 1145    Rehabilitation Potential For Stated Goals: Good     PAIN/SUBJECTIVE:   Initial: Pain Intensity 1: 0  Post Session:  0/10   HISTORY:   History of Injury/Illness (Reason for Referral):  Patient reports she has been having balance issues for a while now. She reports she started out having pain in her leg and groin in her left leg. She reports that the pain is gone now, but she was scared she had had a CVA or has MS. She reports she has not fallen, but has just been off balance with ambulation at times. She reports she can't stand on her left leg to lift her right leg because she doesn't feel it will hold her. She reports that she also has some ringing in her ears that she feels is throwing off her balance as well. She reports she would like to work to improve her overall balance and mobility. 8/10/2021 (Progress Note): Patient reports she is feeling better overall, but still has some dizziness and times when her legs do not feel like they belong to her. Past Medical History/Comorbidities:   Ms. Al Leal  has a past medical history of Chronic obstructive pulmonary disease ([facility] [address] Dystonia, H/O gastric bypass (12/9/2013 at [facility]), diabetes mellitus, Liver cirrhosis secondary to JEAN ([address] Obese, Parkinson's disease ([address] (dx--2010), RBD (REM behavioral disorder) (5/24/2018), RLS (restless legs syndrome), Thromboembolus ([address] (2008), Unspecified adverse effect of anesthesia (3/2014 at [facility]), and Unspecified sleep apnea. She also has no past medical history of Difficult intubation, Malignant hyperthermia due to anesthesia, Nausea & vomiting, or Pseudocholinesterase deficiency.   Ms. Al Leal  has a past surgical history that includes hx lap cholecystectomy (3/2013); hx gastric bypass (12/2013 at [facility]); hx hernia repair (6/2013); and hx other surgical ([facility]). Social History/Living Environment:   Home Environment: Private residence  Living Alone: No  Support Systems: Family member(s), Friends \ neighbors   Prior Level of Function/Work/Activity:  Independent  Dominant Side:         RIGHT  Personal Factors:          Sex:  female        Age:  63 y.o. Current Medications:       Current Outpatient Medications:     nystatin (MYCOSTATIN) powder, Apply twice daily, Disp: 60 g, Rfl: 2    mupirocin (BACTROBAN) 2 % ointment, Apply  to affected area daily. , Disp: 22 g, Rfl: 0    pantoprazole (Protonix) 40 mg tablet, Take 1 Tab by mouth daily. , Disp: 90 Tab, Rfl: 4    citalopram (CELEXA) 40 mg tablet, Take 1 Tab by mouth daily. , Disp: 90 Tab, Rfl: 1    guaiFENesin-dextromethorphan SR (Mucinex DM) 600-30 mg per tablet, Take 1 Tab by mouth two (2) times a day., Disp: 60 Tab, Rfl: 1    ipratropium-albuterol (COMBIVENT RESPIMAT)  mcg/actuation inhaler, Take 1 Puff by inhalation four (4) times daily. , Disp: 1 Inhaler, Rfl: 6   Date Last Reviewed:  8/10/2021    EXAMINATION:   Observation/Orthostatic Postural Assessment:          Posture Assessment: Rounded shoulders, Forward head   Functional Mobility:   Gait/Mobility:    · Base of Support: Center of gravity altered  · Speed/Jammie: Pace decreased (<100 feet/min)  · Step Length: Left shortened, Right shortened  · Swing Pattern: Left symmetrical, Right symmetrical  · Gait Abnormalities: Antalgic (mild)  · Ambulation - Level of Assistance: Independent  · Interventions: Verbal cues, Safety awareness training          Transfers:     · Sit to Stand: Modified independent  · Stand to Sit: Independent  · Stand Pivot Transfers: Modified independent  · Bed to Chair: Modified independent  · Lateral Transfers: Modified independent         Bed Mobility:     · Rolling: Independent  · Supine to Sit: Independent  · Sit to Supine: Independent  · Scooting: Independent       Strength:          UE STRENGTH: 4/5 shoulder flexion, 4/5 shoulder abduction, 4/5 shoulder extension, 4/5 elbow flexion, 4/5 elbow extension. LE STRENGTH:  4-/5 hip flexion, 4-/5 hip abduction, 4-/5 hip adduction, 4-/5 hip extension, 4-/5 knee extension, 4-/5 knee flexion, 2+/5 ankle dorsiflexion, 2+/5 ankle plantarflexion, 2+/5 ankle inversion, 2+/5 ankle eversion.   Sensation:         Intact

## 2021-08-18 ENCOUNTER — HOSPITAL ENCOUNTER (OUTPATIENT)
Dept: PHYSICAL THERAPY | Age: 61
Discharge: HOME | End: 2021-08-18
Attending: PSYCHIATRY & NEUROLOGY
Payer: COMMERCIAL

## 2021-08-18 PROCEDURE — 97112 NEUROMUSCULAR REEDUCATION: CPT

## 2021-08-18 NOTE — PROGRESS NOTES
Grcae Carmona Ploof  : 1960  Primary: [employer] [employer]  Secondary:  [address] at [address] [place] [id number] [facility], [address] [place] [address],[address], [address] [facility]  [phone]   [phone]        OUTPATIENT PHYSICAL THERAPY: Daily Treatment Note 2021  Visit Count:  11    ICD-10: Treatment Diagnosis:     Dizziness and giddiness (R42)   Other abnormalities of gait and mobility (R26.89)   Precautions/Allergies:   Adhesive tape-silicones and Pcn [penicillins]   TREATMENT PLAN:  Effective Dates: 2021 TO 10/5/2021 (90 days). Frequency/Duration: 2 times a week for 90 Day(s)    Pre-treatment Symptoms/Complaints:  2021: Patient reports she is about the same. Pain: Initial: Pain Intensity 1: 0  Post Session:  0/10   Medications Last Reviewed:  2021  Updated Objective Findings:  None Today  TREATMENT:     NEUROMUSCULAR RE-EDUCATION: (45 minutes):  Exercise/activities per grid below to improve balance, coordination, kinesthetic sense, posture and proprioception. Required minimal visual and verbal cues to promote static and dynamic balance in standing. Balance/Vestibular Treatment:   Activity   Date  2021   Activity/Exercise   Sets/reps/equipment   Walking with head turns     4 laps   Walking with head up & down     4 laps   Step ups        Step taps     6 inch   2x10 reps   Marching   4 laps   Sidestepping   4 laps   Crossovers      [place]      Walking  backwards        Tandem walking   4 laps   Weaving in/out of cones     4 laps   Picking up cones        Stepping over half foam   10 reps forward  B LE  10 reps lateral B LE   Ball toss      Kick ball      Figure 8s       Circles right/left      Walking with 360 degree turns      Spirals      Weight shifting:    Left & Right        Weight shifting:   Forward & Backward         Static Standing Balance     X   Standing with feet apart        Standing with feet together     Blue foams  5 seconds eyes closed  3 reps   Standing with feet semitandem   Level ground  5 seconds eyes closed  3 reps each direction   Standing with feet tandem      Single leg stance          Treatment/Session Summary:    · Response to Treatment:  Patient tolerated treatment well, but did report feeling \"a little off\" after treatment. · Communication/Consultation:  None today  · Equipment provided today:  None today  · Recommendations/Intent for next treatment session: Next visit will focus on improving overall mobility.     Total Treatment Billable Duration:  45 minutes   PT Patient Time In/Time Out  Time In: 0930  Time Out: 7[facility], PT    Visit # Therapist initials Date Arrived NS/ Cx < 24 hr >24 hr Cx Visit Comments   11  8/18/2021 X   No visit limit                                                                                                                                                              Abbreviations: NS = No Show; CX = cancelled     Future Appointments   Date Time Provider Gladis Jakcson   8/18/2021  9:30 AM Jasmyne Patiño PT [facility]   8/24/2021 11:00 AM Jasmyne Patiño PT [facility]   8/26/2021 10:15 AM Jasmyne Patiño PT [facility]   8/31/2021 11:00 AM Jasmyne Patiño PT [facility]   9/8/2021  2:30 PM Julissa Alanis MD [facility] BSNE

## 2021-08-24 ENCOUNTER — HOSPITAL ENCOUNTER (OUTPATIENT)
Dept: PHYSICAL THERAPY | Age: 61
Discharge: HOME | End: 2021-08-24
Attending: PSYCHIATRY & NEUROLOGY
Payer: COMMERCIAL

## 2021-08-24 PROCEDURE — 97112 NEUROMUSCULAR REEDUCATION: CPT

## 2021-08-24 NOTE — PROGRESS NOTES
Toñito Estevez  : 1960  Primary: [facility]  Secondary:  [address] at [place] [id number] [facility] [address] [facility], [address] [place] [address],[address], [facility] [address].  [phone]   [phone]        OUTPATIENT PHYSICAL THERAPY: Daily Treatment Note 2021  Visit Count:  12    ICD-10: Treatment Diagnosis:     Dizziness and giddiness (R42)   Other abnormalities of gait and mobility (R26.89)   Precautions/Allergies:   Adhesive tape-silicones and Pcn [penicillins]   TREATMENT PLAN:  Effective Dates: 2021 TO 10/5/2021 (90 days). Frequency/Duration: 2 times a week for 90 Day(s)    Pre-treatment Symptoms/Complaints:  2021: Patient reports she is doing well today. Pain: Initial: Pain Intensity 1: 0  Post Session:  0/10   Medications Last Reviewed:  2021  Updated Objective Findings:  None Today  TREATMENT:     NEUROMUSCULAR RE-EDUCATION: (45 minutes):  Exercise/activities per grid below to improve balance, coordination, kinesthetic sense, posture and proprioception. Required minimal visual and verbal cues to promote static and dynamic balance in standing. Balance/Vestibular Treatment:   Activity   Date  2021   Activity/Exercise   Sets/reps/equipment   Walking with head turns     4 laps   Walking with head up & down     4 laps   Step ups        Step taps     6 inch   2x10 reps   Marching   4 laps   Sidestepping   4 laps   Crossovers      [place]      Walking  backwards        Tandem walking   4 laps   Weaving in/out of cones     4 laps   Picking up cones        Stepping over half foam   10 reps forward  B LE  10 reps lateral B LE   Ball toss      Kick ball      Figure 8s       Circles right/left      Walking with 360 degree turns      Spirals      Weight shifting:    Left & Right        Weight shifting:   Forward & Backward         Static Standing Balance     X   Standing with feet apart        Standing with feet together     Blue foams  5 seconds eyes closed  3 reps   Standing with feet semitandem   Level ground  5 seconds eyes closed  3 reps each direction   Standing with feet tandem      Single leg stance          Treatment/Session Summary:    · Response to Treatment:  Patient tolerated treatment well with improving overall mobility and balance. · Communication/Consultation:  None today  · Equipment provided today:  None today  · Recommendations/Intent for next treatment session: Next visit will focus on improving overall mobility.     Total Treatment Billable Duration:  45 minutes   PT Patient Time In/Time Out  Time In: 1100  Time Out: 503 [facility], PT    Visit # Therapist initials Date Arrived NS/ Cx < 24 hr >24 hr Cx Visit Comments   11  8/18/2021 X   No visit limit   12  8/24/2021 X                                                                                                                                                        Abbreviations: NS = No Show; CX = cancelled     Future Appointments   Date Time Provider Gladis Jackson   8/24/2021 11:00 AM Bouchra Gracia, PT [facility]   8/26/2021 10:15 AM Bouchra Gracia, PT [facility]   8/31/2021 11:00 AM Bouchra Gracia PT [facility]   9/8/2021  2:30 PM Yasmine Swartz MD BS[facility] BSNE

## 2021-08-26 ENCOUNTER — HOSPITAL ENCOUNTER (OUTPATIENT)
Dept: PHYSICAL THERAPY | Age: 61
Discharge: HOME | End: 2021-08-26
Attending: PSYCHIATRY & NEUROLOGY
Payer: COMMERCIAL

## 2021-08-26 PROCEDURE — 97112 NEUROMUSCULAR REEDUCATION: CPT

## 2021-08-26 NOTE — PROGRESS NOTES
Addy Kessler Ploof  : 1960  Primary: [facility]  Secondary:  [address] at [address] [place] [address] [address],[address], [place] [address].  [phone]   [phone]        OUTPATIENT PHYSICAL THERAPY: Daily Treatment Note 2021  Visit Count:  13    ICD-10: Treatment Diagnosis:     Dizziness and giddiness (R42)   Other abnormalities of gait and mobility (R26.89)   Precautions/Allergies:   Adhesive tape-silicones and Pcn [penicillins]   TREATMENT PLAN:  Effective Dates: 2021 TO 10/5/2021 (90 days). Frequency/Duration: 2 times a week for 90 Day(s)    Pre-treatment Symptoms/Complaints:  2021: Patient reports she is doing well today. Pain: Initial: Pain Intensity 1: 0  Post Session:  0/10   Medications Last Reviewed:  2021  Updated Objective Findings:  None Today  TREATMENT:     NEUROMUSCULAR RE-EDUCATION: (15 minutes):  Exercise/activities per grid below to improve balance, coordination, kinesthetic sense, posture and proprioception. Required minimal visual and verbal cues to promote static and dynamic balance in standing. Balance/Vestibular Treatment:   Activity   Date  2021   Activity/Exercise   Sets/reps/equipment   Walking with head turns     4 laps   Walking with head up & down     4 laps   Step ups        Step taps     Not today:  6 inch   2x10 reps   Marching   4 laps   Sidestepping   4 laps   Crossovers      [place]      Walking  backwards        Tandem walking   Not today:  4 laps   Weaving in/out of cones     Not today:  4 laps   Picking up cones        Stepping over half foam   Not today:  10 reps forward  B LE  10 reps lateral B LE   Ball toss      Kick ball      Figure 8s       Circles right/left      Walking with 360 degree turns      Spirals      Weight shifting:    Left & Right        Weight shifting:   Forward & Backward         Static Standing Balance     X   Standing with feet apart        Standing with feet together     Not today:  Blue foams  5 seconds eyes closed  3 reps   Standing with feet semitandem   Not today:  Level ground  5 seconds eyes closed  3 reps each direction   Standing with feet tandem      Single leg stance          Treatment/Session Summary:    · Response to Treatment:  Patient was only able to tolerate about 15 minutes of treatment secondary to bout of dystonia. · Communication/Consultation:  None today  · Equipment provided today:  None today  · Recommendations/Intent for next treatment session: Next visit will focus on improving overall mobility.     Total Treatment Billable Duration:  45 minutes   PT Patient Time In/Time Out  Time In: 1015  Time Out: 751 [facility], PT    Visit # Therapist initials Date Arrived NS/ Cx < 24 hr >24 hr Cx Visit Comments   11  8/18/2021 X   No visit limit   12  8/24/2021 X      13  8/26/2021 X                                                                                                                                               Abbreviations: NS = No Show; CX = cancelled     Future Appointments   Date Time Provider Gladis Jackson   8/31/2021 11:00 AM Lesli Haley PT [facility] SFE   9/8/2021  2:30 PM Johnathan Ramachandran MD [facility] BSNE

## 2021-08-31 ENCOUNTER — HOSPITAL ENCOUNTER (OUTPATIENT)
Dept: PHYSICAL THERAPY | Age: 61
Discharge: HOME | End: 2021-08-31
Attending: PSYCHIATRY & NEUROLOGY
Payer: COMMERCIAL

## 2021-08-31 PROCEDURE — 97112 NEUROMUSCULAR REEDUCATION: CPT

## 2021-08-31 NOTE — PROGRESS NOTES
María Fabian Ploof  : 1960  Primary: Karen Gonzalez [facility]  Secondary:  [address] at [address] [facility]  [address], [address], [facility] [address].  [phone]   [phone]        OUTPATIENT PHYSICAL THERAPY: Daily Treatment Note 2021  Visit Count:  14    ICD-10: Treatment Diagnosis:     Dizziness and giddiness (R42)   Other abnormalities of gait and mobility (R26.89)   Precautions/Allergies:   Adhesive tape-silicones and Pcn [penicillins]   TREATMENT PLAN:  Effective Dates: 2021 TO 10/5/2021 (90 days). Frequency/Duration: 2 times a week for 90 Day(s)    Pre-treatment Symptoms/Complaints:  2021: Patient reports she is doing well today. Pain: Initial: Pain Intensity 1: 0  Post Session:  0/10   Medications Last Reviewed:  2021  Updated Objective Findings:  None Today  TREATMENT:     NEUROMUSCULAR RE-EDUCATION: (40 minutes):  Exercise/activities per grid below to improve balance, coordination, kinesthetic sense, posture and proprioception. Required minimal visual and verbal cues to promote static and dynamic balance in standing. Balance/Vestibular Treatment:   Activity   Date  2021   Activity/Exercise   Sets/reps/equipment   Walking with head turns     4 laps   Walking with head up & down     4 laps   Step ups        Step taps     6 inch   2x10 reps   Marching   4 laps   Sidestepping   4 laps   Crossovers      [place]      Walking  backwards        Tandem walking   4 laps   Weaving in/out of cones     4 laps   Picking up cones        Stepping over half foam   10 reps forward  B LE  10 reps lateral B LE   Ball toss      Kick ball      Figure 8s       Circles right/left      Walking with 360 degree turns      Spirals      Weight shifting:    Left & Right     Tiltboard   Weight shifting:   Forward & Backward      Tiltboard   Static Standing Balance     Tiltboard   Standing with feet apart        Standing with feet together     Blue foams  5 seconds eyes closed  3 reps   Standing with feet semitandem     Level ground  5 seconds eyes closed  3 reps each direction   Standing with feet tandem      Single leg stance          Treatment/Session Summary:    · Response to Treatment:  Patient completed all activities with minimal complaints of dizziness. · Communication/Consultation:  None today  · Equipment provided today:  None today  · Recommendations/Intent for next treatment session: Next visit will focus on improving overall mobility.     Total Treatment Billable Duration:  45 minutes   PT Patient Time In/Time Out  Time In: 1100  Time Out: 503 [facility], PT    Visit # Therapist initials Date Arrived NS/ Cx < 24 hr >24 hr Cx Visit Comments   11  8/18/2021 X   No visit limit   12  8/24/2021 X      13  8/26/2021 X      14  8/31/2021 X                                                                                                                                      Abbreviations: NS = No Show; CX = cancelled     Future Appointments   Date Time Provider Gladis Jackson   9/2/2021  1:45 PM Odalisangeles Ray, PT [facility]   9/8/2021  3:15 PM Odalis Flor, PT [facility]   9/13/2021  8:30 AM Alejandra Matthews MD [facility] BSNE   9/13/2021  1:00 PM Eric Dumas, PT [facility]   9/21/2021  1:00 PM Odalis Flor, PT [facility]   9/24/2021  1:00 PM Odalis Magnusze, PT [facility]   9/28/2021  1:00 PM Odalis Glaze, PT [facility]   10/1/2021  1:00 PM Odalis Glaze, PT [facility] S[facility]

## 2021-09-02 ENCOUNTER — HOSPITAL ENCOUNTER (OUTPATIENT)
Dept: PHYSICAL THERAPY | Age: 61
Discharge: HOME | End: 2021-09-02
Attending: PSYCHIATRY & NEUROLOGY
Payer: COMMERCIAL

## 2021-09-02 NOTE — PROGRESS NOTES
Xavier Spivey Ploof  : 1960  Primary: [facility]  Secondary:  [address] at [place] [id number] [facility], [address] [place] [address],[address], [facility] [address].  [phone]   [phone]          OUTPATIENT DAILY NOTE    NAME/AGE/GENDER: Aubrey Flower is a 63 y.o. female. DATE: 2021    Patient cancelled (less than 24 hours ago) her appointment for today due to her mom having fallen and she is still at the MDs office with her. Will plan to follow up on next scheduled visit.     Kevin Valle, PT    Future Appointments   Date Time Provider Gladis Jackson   2021  3:15 PM Yaron Leisure, PT [facility] SFE   2021  8:30 AM Florentin Matthews MD [facility] BSNE   2021  1:00 PM Toñito Avery, PT [facility]   2021  1:00 PM Yaron Leisure, PT [facility]   2021  1:00 PM Yaron Leisure, PT [facility]   2021  1:00 PM Yaron Leisure, PT [facility]   10/1/2021  1:00 PM Yaron Leisure, PT [facility] S[facility]

## 2021-09-08 ENCOUNTER — HOSPITAL ENCOUNTER (OUTPATIENT)
Dept: PHYSICAL THERAPY | Age: 61
Discharge: HOME | End: 2021-09-08
Attending: PSYCHIATRY & NEUROLOGY
Payer: COMMERCIAL

## 2021-09-08 PROCEDURE — 97112 NEUROMUSCULAR REEDUCATION: CPT

## 2021-09-08 NOTE — PROGRESS NOTES
[place] Oksana Estevez  : 1960  Primary: Baljinder Gómez  Secondary:  [address] at [place] [id number] [facility], [address] [place] [address],[address], [facility] [address].  [phone]   [phone]        OUTPATIENT PHYSICAL THERAPY: Daily Treatment Note 2021  Visit Count:  15    ICD-10: Treatment Diagnosis:     Dizziness and giddiness (R42)   Other abnormalities of gait and mobility (R26.89)   Precautions/Allergies:   Adhesive tape-silicones and Pcn [penicillins]   TREATMENT PLAN:  Effective Dates: 2021 TO 10/5/2021 (90 days). Frequency/Duration: 2 times a week for 90 Day(s)    Pre-treatment Symptoms/Complaints:  2021: Patient reports she is doing ok right now, just worried about her parents. Pain: Initial: Pain Intensity 1: 0  Post Session:  0/10   Medications Last Reviewed:  2021  Updated Objective Findings:  None Today  TREATMENT:     NEUROMUSCULAR RE-EDUCATION: (40 minutes):  Exercise/activities per grid below to improve balance, coordination, kinesthetic sense, posture and proprioception. Required minimal visual and verbal cues to promote static and dynamic balance in standing. Balance/Vestibular Treatment:   Activity   Date  2021   Activity/Exercise   Sets/reps/equipment   Walking with head turns     4 laps   Walking with head up & down     4 laps   Step ups        Step taps     6 inch   2x10 reps   Marching   4 laps   Sidestepping   4 laps   Crossovers      [place]      Walking  backwards        Tandem walking   4 laps   Weaving in/out of cones     4 laps   Picking up cones        Stepping over half foam   10 reps forward  B LE  10 reps lateral B LE   Ball toss      Kick ball      Figure 8s       Circles right/left      Walking with 360 degree turns      Spirals      Weight shifting:    Left & Right     Tiltboard   Weight shifting:   Forward & Backward      Tiltboard   Static Standing Balance     Tiltboard   Standing with feet apart        Standing with feet together Blue foams  5 seconds eyes closed  3 reps   Standing with feet semitandem     Level ground  5 seconds eyes closed  3 reps each direction   Standing with feet tandem      Single leg stance          Treatment/Session Summary:    · Response to Treatment:  Patient completed all activities with minimal complaints of dizziness. · Communication/Consultation:  None today  · Equipment provided today:  None today  · Recommendations/Intent for next treatment session: Next visit will focus on improving overall mobility.     Total Treatment Billable Duration:  45 minutes   PT Patient Time In/Time Out  Time In: 1728  Time Out: [address], PT    Visit # Therapist initials Date Arrived NS/ Cx < 24 hr >24 hr Cx Visit Comments   11  8/18/2021 X   No visit limit   12  8/24/2021 X      13  8/26/2021 X      14  8/31/2021 X        9/2/2021  X   Mom fell; at MDs office   15  9/8/2021 X                                                                                                                     Abbreviations: NS = No Show; CX = cancelled     Future Appointments   Date Time Provider Gladis Barbouri   9/8/2021  3:15 PM Marco Najera, PT [facility] SFE   9/13/2021  8:30 AM Osman Matthews MD [facility] BSNE   9/13/2021  1:00 PM Kailash Dumas, PT [facility]   9/21/2021  1:00 PM Marco Najera, PT [facility]   9/24/2021  1:00 PM Marco Najera, PT [facility]   9/28/2021  1:00 PM Marco Najera, PT [facility]   10/1/2021  1:00 PM Marco Najera, PT [facility] SFE

## 2021-09-13 ENCOUNTER — HOSPITAL ENCOUNTER (OUTPATIENT)
Dept: PHYSICAL THERAPY | Age: 61
Discharge: HOME | End: 2021-09-13
Attending: PSYCHIATRY & NEUROLOGY
Payer: COMMERCIAL

## 2021-09-13 PROCEDURE — 97112 NEUROMUSCULAR REEDUCATION: CPT

## 2021-09-13 NOTE — PROGRESS NOTES
Maria Ines Estevez  : 1960  Primary: Evelio Gómez  Secondary:  [address] at [place] [id number] [facility], [address] [place] [address],[address], [place] [address].  [phone]   [phone]        OUTPATIENT PHYSICAL THERAPY: Daily Treatment Note 2021  Visit Count:  16    ICD-10: Treatment Diagnosis:     Dizziness and giddiness (R42)   Other abnormalities of gait and mobility (R26.89)   Precautions/Allergies:   Adhesive tape-silicones and Pcn [penicillins]   TREATMENT PLAN:  Effective Dates: 2021 TO 10/5/2021 (90 days). Frequency/Duration: 2 times a week for 90 Day(s)    Pre-treatment Symptoms/Complaints:  2021: Patient reports no falls. Pain: Initial: Pain Intensity 1: 0  Post Session:  0/10   Medications Last Reviewed:  2021  Updated Objective Findings:  None Today  TREATMENT:     NEUROMUSCULAR RE-EDUCATION: (45 minutes):  Exercise/activities per grid below to improve balance, coordination, kinesthetic sense, posture and proprioception. Required minimal visual and verbal cues to promote static and dynamic balance in standing. Balance/Vestibular Treatment:   Activity   Date  2021   Activity/Exercise   Sets/reps/equipment   Walking with head turns     4 laps   Walking with head up & down     4 laps   Step ups        Step taps     6 inch   2x10 reps   Marching   4 laps   Sidestepping   4 laps   Crossovers      [place]      Walking  backwards        Tandem walking   4 laps   Weaving in/out of cones     4 laps    Picking up cones        Stepping over half foam   10 reps forward  B LE  10 reps lateral B LE   Ball toss      Kick ball      Figure 8s       Circles right/left      Walking with 360 degree turns      Spirals      Weight shifting:    Left & Right     Tiltboard   Weight shifting:   Forward & Backward      Tiltboard   Static Standing Balance     Tiltboard   Standing with feet apart        Standing with feet together     Blue foams  5 seconds eyes closed  3 reps Standing with feet semitandem     Level ground  5 seconds eyes closed  3 reps each direction   Standing with feet tandem      Single leg stance          Treatment/Session Summary:    · Response to Treatment:  Patient lost her balance once walking in and out of cones. Patient tolerated treatment well. · Communication/Consultation:  None today  · Equipment provided today:  None today  · Recommendations/Intent for next treatment session: Next visit will focus on improving overall mobility.     Total Treatment Billable Duration:  45 minutes   PT Patient Time In/Time Out  Time In: 6334  Time Out: 826 [facility]    Visit # Therapist initials Date Arrived NS/ Cx < 24 hr >24 hr Cx Visit Comments   11 JS 8/18/2021 X   No visit limit   12 JS 8/24/2021 X      13 JS 8/26/2021 X      14 JS 8/31/2021 X       JS 9/2/2021  X   Mom fell; at MDs office   15 JS 9/8/2021 X       16 PV 9/13/2021 X                                                                                                           Abbreviations: NS = No Show; CX = cancelled     Future Appointments   Date Time Provider Gladis Jackson   9/21/2021  1:00 PM Naomi Carr, PT [facility] SFE   9/24/2021  1:00 PM Naomi Carr, PT [facility]   9/28/2021  1:00 PM Naomi Carr, PT [facility]FE   10/1/2021  1:00 PM Naomi Carr, PT [facility]   12/15/2021 10:00 AM Dusty Matthews MD [facility] BSNE

## 2021-09-21 ENCOUNTER — HOSPITAL ENCOUNTER (OUTPATIENT)
Dept: PHYSICAL THERAPY | Age: 61
Discharge: HOME | End: 2021-09-21
Attending: PSYCHIATRY & NEUROLOGY
Payer: COMMERCIAL

## 2021-09-21 PROCEDURE — 97112 NEUROMUSCULAR REEDUCATION: CPT

## 2021-09-21 NOTE — PROGRESS NOTES
Marcia Estevez  : 1960  Primary: Dhara Tinajero [facility]  Secondary:  [address] at [address], [address] [place] [address],[address], [place] [address].  [phone]   [phone]        OUTPATIENT PHYSICAL THERAPY: Daily Treatment Note 2021  Visit Count:  17    ICD-10: Treatment Diagnosis:     Dizziness and giddiness (R42)   Other abnormalities of gait and mobility (R26.89)   Precautions/Allergies:   Adhesive tape-silicones and Pcn [penicillins]   TREATMENT PLAN:  Effective Dates: 2021 TO 10/5/2021 (90 days). Frequency/Duration: 2 times a week for 90 Day(s)    Pre-treatment Symptoms/Complaints:  2021: Patient reports she had a bad day yesterday with her head spinning, but today feels much better. Pain: Initial: Pain Intensity 1: 0  Post Session:  0/10   Medications Last Reviewed:  2021  Updated Objective Findings:  None Today  TREATMENT:     NEUROMUSCULAR RE-EDUCATION: (45 minutes):  Exercise/activities per grid below to improve balance, coordination, kinesthetic sense, posture and proprioception. Required minimal visual and verbal cues to promote static and dynamic balance in standing. Balance/Vestibular Treatment:   Activity   Date  2021   Activity/Exercise   Sets/reps/equipment   Walking with head turns     4 laps   Walking with head up & down     4 laps   Step ups        Step taps     6 inch   2x10 reps   Marching   4 laps   Sidestepping   4 laps   Crossovers      [place]      Walking  backwards        Tandem walking   4 laps   Weaving in/out of cones     4 laps    Picking up cones        Stepping over half foam   10 reps forward  B LE  10 reps lateral B LE   Ball toss      Kick ball      Figure 8s       Circles right/left      Walking with 360 degree turns      Spirals      Weight shifting:    Left & Right     Tiltboard   Weight shifting:   Forward & Backward      Tiltboard   Static Standing Balance     Tiltboard   Standing with feet apart Standing with feet together     Blue foams  5 seconds eyes closed  3 reps   Standing with feet semitandem     Level ground  5 seconds eyes closed  3 reps each direction   Standing with feet tandem      Single leg stance          Treatment/Session Summary:    · Response to Treatment:  Patient tolerated treatment well with improving overall mobility. · Communication/Consultation:  None today  · Equipment provided today:  None today  · Recommendations/Intent for next treatment session: Next visit will focus on improving overall mobility.     Total Treatment Billable Duration:  45 minutes   PT Patient Time In/Time Out  Time In: 1300  Time Out: 75 Carolyn Mclean PT    Visit # Therapist initials Date Arrived NS/ Cx < 24 hr >24 hr Cx Visit Comments   11 JS 8/18/2021 X   No visit limit   12 JS 8/24/2021 X      13  8/26/2021 X      14 JS 8/31/2021 X        9/2/2021  X   Mom fell; at MDs office   15 JS 9/8/2021 X       16 PV 9/13/2021 X      17 JS 9/21/2021 X    Progress Note                                                                                               Abbreviations: NS = No Show; CX = cancelled     Future Appointments   Date Time Provider Gladis Jackson   9/21/2021  1:00 PM [facility]   9/24/2021  1:00 PM Nani Greenwood PT [facility]   9/28/2021  1:00 PM Nani Greenwood, PT [facility]   10/1/2021  1:00 PM Nani Greenwood, PT [facility]   12/15/2021 10:00 AM Dionte Matthews MD [facility]

## 2021-09-21 NOTE — PROGRESS NOTES
Rajesh Rothman Ploof  : 1960  Primary: Marcos Washington [facility]  Secondary:  [address] at [address], [address] [place] [address],[address], [facility] 91.  [phone]   [phone]          OUTPATIENT PHYSICAL THERAPY:Progress Report 2021   ICD-10: Treatment Diagnosis:     Dizziness and giddiness (R42)   Other abnormalities of gait and mobility (R26.89)   Precautions/Allergies:   Adhesive tape-silicones and Pcn [penicillins]   TREATMENT PLAN:  Effective Dates: 2021 TO 10/5/2021 (90 days). Frequency/Duration: 2 times a week for 90 Day(s) MEDICAL/REFERRING DIAGNOSIS:  Other abnormalities of gait and mobility [R26.89]   DATE OF ONSET: Chronic  REFERRING PHYSICIAN: [place] Matthews MD Orders: Evaluate and Treat  Return MD Appointment: TBD     ASSESSMENT:  Ms. Emelina Marie presents with improving mobility, strength, and safety with ambulation since initial evaluation. Patient has attended a total of 17 scheduled physical therapy visits including initial evaluation on 2021. Treatment has consisted of mobility and balance activities to improve overall safety and performance with activities of daily living. Patient is making steady progress with all aspects of therapy at this time. PROBLEM LIST (Impacting functional limitations):  1. Decreased Strength  2. Decreased ADL/Functional Activities  3. Decreased Ambulation Ability/Technique  4. Decreased Balance  5. Decreased Activity Tolerance INTERVENTIONS PLANNED: (Treatment may consist of any combination of the following)  1. Balance Exercise  2. Gait Training  3. Home Exercise Program (HEP)  4. Neuromuscular Re-education/Strengthening  5. Range of Motion (ROM)  6. Therapeutic Activites  7. Therapeutic Exercise/Strengthening     GOALS: (Goals have been discussed and agreed upon with patient.)  Short-Term Functional Goals: Time Frame: 30 days  1.  Patient will be independent with home exercise program without exacerbation of symptoms or cueing needed--goal met. Discharge Goals: Time Frame: 90 days  1. Patient will be independent with all ADLs with minimal fear of falling and loss of balance with daily tasks--goal ongoing (stairs). 2. Patient will report no fear avoidance with social or recreational activities due to fear of falling--goal ongoing. 3. Patient will score greater than or equal to 50/56 on Bashir Balance Scale with minimal effect of imbalance on patient's ability to manage every day life activities--goal ongoing. 4. Patient will score greater than or equal to 25/30 on Functional Gait Assessment with minimal effect of imbalance on patient's ability to manage every day life activities--goal ongoing. OUTCOME MEASURE:   Tool Used: Bashir Balance Scale  Score:  Initial: 45/56 Most Recent: 46/56 (Date: 9/21/2021 )   Interpretation of Score: Each section is scored on a 0-4 scale, 0 representing the patients inability to perform the task and 4 representing independence. The scores of each section are added together for a total score of 56. The higher the patients score, the more independent the patient is. Any score below 45 indicates increased risk for falls. Tool Used: Functional Gait Assessment  Score:  Initial: 18/30  Most Recent: 20/30 (Date: 9/21/2021 )   Interpretation of Score: This test is a modification of the Dynamic Gait Index. It is a 10 item test with each item score 0-3 that assesses postural stability during various walking tasks. MEDICAL NECESSITY:   · Patient is expected to demonstrate progress in strength, range of motion, balance, coordination and functional technique to improve safety during daily activities. · Patient demonstrates good rehab potential due to higher previous functional level. · Skilled intervention continues to be required due to decreased functional mobility.   REASON FOR SERVICES/OTHER COMMENTS:  · Patient continues to demonstrate capacity to improve overall functional mobility which will increase independence and increase safety. Total Duration:  PT Patient Time In/Time Out  Time In: 1300  Time Out: 1345    Rehabilitation Potential For Stated Goals: Good     PAIN/SUBJECTIVE:   Initial: Pain Intensity 1: 0  Post Session:  0/10   HISTORY:   History of Injury/Illness (Reason for Referral):  Patient reports she has been having balance issues for a while now. She reports she started out having pain in her leg and groin in her left leg. She reports that the pain is gone now, but she was scared she had had a CVA or has MS. She reports she has not fallen, but has just been off balance with ambulation at times. She reports she can't stand on her left leg to lift her right leg because she doesn't feel it will hold her. She reports that she also has some ringing in her ears that she feels is throwing off her balance as well. She reports she would like to work to improve her overall balance and mobility. 8/10/2021 (Progress Note): Patient reports she is feeling better overall, but still has some dizziness and times when her legs do not feel like they belong to her.    9/21/2021 (Progress Note): Patient reports she is feeling better. She reports she still has some days where the dizziness is bad, but it feels better overall. Past Medical History/Comorbidities:   Ms. Viridiana Lopez  has a past medical history of Chronic obstructive pulmonary disease ([facility] [address] Dystonia, H/O gastric bypass (12/9/2013 at [facility]), diabetes mellitus, Liver cirrhosis secondary to JEAN ([facility] [address] Obese, Parkinson's disease ([facility] [address] (dx--2010), RBD (REM behavioral disorder) (5/24/2018), RLS (restless legs syndrome), Thromboembolus ([facility] [address] (2008), Unspecified adverse effect of anesthesia (3/2014 at [facility]), and Unspecified sleep apnea. She also has no past medical history of Difficult intubation, Malignant hyperthermia due to anesthesia, Nausea & vomiting, or Pseudocholinesterase deficiency.   Ms. Viridiana Lopez  has a past surgical history that includes hx lap cholecystectomy (3/2013); hx gastric bypass (12/2013 at [facility] hx hernia repair (6/2013); and hx other surgical (3/2014 at [facility]). Social History/Living Environment:   Home Environment: Private residence  Living Alone: No  Support Systems: Family member(s), Friends \ neighbors   Prior Level of Function/Work/Activity:  Independent  Dominant Side:         RIGHT  Personal Factors:          Sex:  female        Age:  63 y.o. Current Medications:       Current Outpatient Medications:     citalopram (CELEXA) 40 mg tablet, Take 1 Tablet by mouth daily. , Disp: 90 Tablet, Rfl: 1    nystatin (MYCOSTATIN) powder, Apply twice daily, Disp: 60 g, Rfl: 2    mupirocin (BACTROBAN) 2 % ointment, Apply  to affected area daily. , Disp: 22 g, Rfl: 0    pantoprazole (Protonix) 40 mg tablet, Take 1 Tab by mouth daily. , Disp: 90 Tab, Rfl: 4    guaiFENesin-dextromethorphan SR (Mucinex DM) 600-30 mg per tablet, Take 1 Tab by mouth two (2) times a day., Disp: 60 Tab, Rfl: 1    ipratropium-albuterol (COMBIVENT RESPIMAT)  mcg/actuation inhaler, Take 1 Puff by inhalation four (4) times daily. , Disp: 1 Inhaler, Rfl: 6   Date Last Reviewed:  9/21/2021    EXAMINATION:   Observation/Orthostatic Postural Assessment:          Posture Assessment: Rounded shoulders, Forward head   Functional Mobility:   Gait/Mobility:    · Base of Support: Center of gravity altered  · Speed/Jammie: Pace decreased (<100 feet/min)  · Step Length: Left shortened, Right shortened  · Swing Pattern: Left symmetrical, Right symmetrical  · Gait Abnormalities: Antalgic (mild)  · Ambulation - Level of Assistance: Independent  · Interventions: Verbal cues, Safety awareness training          Transfers:     · Sit to Stand: Modified independent  · Stand to Sit: Independent  · Stand Pivot Transfers: Modified independent  · Bed to Chair: Modified independent  · Lateral Transfers: Modified independent         Bed Mobility:     · Rolling: Independent  · Supine to Sit: Independent  · Sit to Supine: Independent  · Scooting: Independent       Strength:          UE STRENGTH: 4/5 shoulder flexion, 4/5 shoulder abduction, 4/5 shoulder extension, 4/5 elbow flexion, 4/5 elbow extension. LE STRENGTH:  4-/5 hip flexion, 4-/5 hip abduction, 4-/5 hip adduction, 4-/5 hip extension, 4-/5 knee extension, 4-/5 knee flexion, 2+/5 ankle dorsiflexion, 2+/5 ankle plantarflexion, 2+/5 ankle inversion, 2+/5 ankle eversion.   Sensation:         Intact

## 2021-09-24 ENCOUNTER — HOSPITAL ENCOUNTER (OUTPATIENT)
Dept: PHYSICAL THERAPY | Age: 61
Discharge: HOME | End: 2021-09-24
Attending: PSYCHIATRY & NEUROLOGY
Payer: COMMERCIAL

## 2021-09-24 PROCEDURE — 97112 NEUROMUSCULAR REEDUCATION: CPT

## 2021-09-24 NOTE — PROGRESS NOTES
Sloane Eastman Ploof  : 1960  Primary: Kia Garcia [facility]  Secondary:  [address] at [place] [address] [facility], [address] [place] [address],[address], [place] [address].  [phone]   [phone]        OUTPATIENT PHYSICAL THERAPY: Daily Treatment Note 2021  Visit Count:  18    ICD-10: Treatment Diagnosis:     Dizziness and giddiness (R42)   Other abnormalities of gait and mobility (R26.89)   Precautions/Allergies:   Adhesive tape-silicones and Pcn [penicillins]   TREATMENT PLAN:  Effective Dates: 2021 TO 10/5/2021 (90 days). Frequency/Duration: 2 times a week for 90 Day(s)    Pre-treatment Symptoms/Complaints:  2021: Patient reports today is a good day. Pain: Initial: Pain Intensity 1: 0  Post Session:  0/10   Medications Last Reviewed:  2021  Updated Objective Findings:  None Today  TREATMENT:     NEUROMUSCULAR RE-EDUCATION: (45 minutes):  Exercise/activities per grid below to improve balance, coordination, kinesthetic sense, posture and proprioception. Required minimal visual and verbal cues to promote static and dynamic balance in standing. Balance/Vestibular Treatment:   Activity   Date  2021   Activity/Exercise   Sets/reps/equipment   Walking with head turns     4 laps   Walking with head up & down     4 laps   Step ups        Step taps     6 inch   2x10 reps   Marching   4 laps   Sidestepping   4 laps   Crossovers      [place]      Walking  backwards        Tandem walking   4 laps   Weaving in/out of cones     4 laps    Picking up cones        Stepping over half foam   10 reps forward  B LE  10 reps lateral B LE   Ball toss      Kick ball      Figure 8s       Circles right/left      Walking with 360 degree turns      Spirals      Weight shifting:    Left & Right     Tiltboard   Weight shifting:   Forward & Backward      Tiltboard   Static Standing Balance     Tiltboard   Standing with feet apart        Standing with feet together     Blue foams  5 seconds eyes closed  3 reps   Standing with feet semitandem   Blue foams  5 seconds eyes closed  3 reps each direction   Standing with feet tandem      Single leg stance          Treatment/Session Summary:    · Response to Treatment:  Patient tolerated treatment well with improving overall mobility. · Communication/Consultation:  None today  · Equipment provided today:  None today  · Recommendations/Intent for next treatment session: Next visit will focus on improving overall mobility.     Total Treatment Billable Duration:  45 minutes   PT Patient Time In/Time Out  Time In: 1100  Time Out: 503 [facility], PT    Visit # Therapist initials Date Arrived NS/ Cx < 24 hr >24 hr Cx Visit Comments   11  8/18/2021 X   No visit limit   12  8/24/2021 X      13  8/26/2021 X      14  8/31/2021 X        9/2/2021  X   Mom fell; at MDs office   15 JS 9/8/2021 X       16 PV 9/13/2021 X      17 JS 9/21/2021 X    Progress Note   18  9/24/2021 X                                                                                          Abbreviations: NS = No Show; CX = cancelled     Future Appointments   Date Time Provider Gladis Jackson   9/24/2021 11:00 AM Luis Armando Snide, PT [facility]   9/28/2021  1:00 PM Luis Armando Snide, PT [facility]   10/1/2021  1:00 PM Luis Armando Snide, PT [facility]   12/15/2021 10:00 AM Briana Matthews MD [facility] BSNE

## 2021-09-28 ENCOUNTER — HOSPITAL ENCOUNTER (OUTPATIENT)
Dept: PHYSICAL THERAPY | Age: 61
Discharge: HOME | End: 2021-09-28
Attending: PSYCHIATRY & NEUROLOGY
Payer: COMMERCIAL

## 2021-09-28 PROCEDURE — 97112 NEUROMUSCULAR REEDUCATION: CPT

## 2021-09-28 NOTE — PROGRESS NOTES
Dary Bull Zenaida  : 1960  Primary: Marbin Gómez  Secondary:  [address] at [place] [address] [facility], [address] [place] [address],[address], [place] [address].  [phone]   [phone]        OUTPATIENT PHYSICAL THERAPY: Daily Treatment Note 2021  Visit Count:  19    ICD-10: Treatment Diagnosis:     Dizziness and giddiness (R42)   Other abnormalities of gait and mobility (R26.89)   Precautions/Allergies:   Adhesive tape-silicones and Pcn [penicillins]   TREATMENT PLAN:  Effective Dates: 2021 TO 10/5/2021 (90 days). Frequency/Duration: 2 times a week for 90 Day(s)    Pre-treatment Symptoms/Complaints:  2021: Patient reports she has a headache today, so she's not sure how much she can do today. Pain: Initial: Pain Intensity 1: 0  Post Session:  0/10   Medications Last Reviewed:  2021  Updated Objective Findings:  None Today  TREATMENT:     NEUROMUSCULAR RE-EDUCATION: (38 minutes):  Exercise/activities per grid below to improve balance, coordination, kinesthetic sense, posture and proprioception. Required minimal visual and verbal cues to promote static and dynamic balance in standing. Balance/Vestibular Treatment:   Activity   Date  2021   Activity/Exercise   Sets/reps/equipment   Walking with head turns     4 laps   Walking with head up & down     4 laps   Step ups        Step taps     6 inch   2x10 reps   Marching   4 laps   Sidestepping   4 laps   Crossovers      [place]      Walking  backwards        Tandem walking   4 laps   Weaving in/out of cones     4 laps    Picking up cones        Stepping over half foam   10 reps forward  B LE  10 reps lateral B LE   Ball toss      Kick ball      Figure 8s       Circles right/left      Walking with 360 degree turns      Spirals      Weight shifting:    Left & Right     Not today: Tiltboard   Weight shifting:   Forward & Backward      Not today:  Tiltboard   Static Standing Balance     Not today:  Tiltboard   Standing with feet apart        Standing with feet together     Blue foams  5 seconds eyes closed  3 reps   Standing with feet semitandem   Blue foams  5 seconds eyes closed  3 reps each direction   Standing with feet tandem      Single leg stance          Treatment/Session Summary:    · Response to Treatment:  Patient tolerated treatment well with several standing rest breaks secondary to head spinning and pain. · Communication/Consultation:  None today  · Equipment provided today:  None today  · Recommendations/Intent for next treatment session: Next visit will focus on improving overall mobility.     Total Treatment Billable Duration:  38 minutes   PT Patient Time In/Time Out  Time In: 1300  Time Out: 75 [address] , PT    Visit # Therapist initials Date Arrived NS/ Cx < 24 hr >24 hr Cx Visit Comments   11  8/18/2021 X   No visit limit   12  8/24/2021 X      13  8/26/2021 X      14  8/31/2021 X       Loraniceto Emilie 9/2/2021  X   Mom fell; at MDs office   15 JS 9/8/2021 X       16 PV 9/13/2021 X      17 JS 9/21/2021 X    Progress Note   18  9/24/2021 X       19 JS 9/28/2021 X                                                                                 Abbreviations: NS = No Show; CX = cancelled     Future Appointments   Date Time Provider Gladis Renetta   9/28/2021  1:00 PM Jennifer Perez PT [facility]   10/1/2021  1:00 PM Jennifer Perez PT [facility]   12/15/2021 10:00 AM Fito Matthews MD [facility] BS[facility]

## 2021-10-01 ENCOUNTER — HOSPITAL ENCOUNTER (OUTPATIENT)
Dept: PHYSICAL THERAPY | Age: 61
Discharge: HOME | End: 2021-10-01
Attending: PSYCHIATRY & NEUROLOGY
Payer: COMMERCIAL

## 2021-10-01 NOTE — PROGRESS NOTES
Randa Pablosherita Ploof  : 1960  Primary: [facility]  Secondary:  [address]  at [place] [address] [facility], [address] [place] [address],[address], [address] [facility]  [phone]   [phone]          OUTPATIENT DAILY NOTE    NAME/AGE/GENDER: Padmini Howe is a 63 y.o. female. DATE: 10/1/2021    Patient cancelled (more than 24 hours ago) her appointment for today due to testing positive for Covid-19.       Regulo Medina, PT    Future Appointments   Date Time Provider Gladis Jackson   10/1/2021  7:00 PM Janet Sánchez [facility]   12/15/2021 10:00 AM Yehuda Matthews MD [facility]

## 2021-10-20 ENCOUNTER — HOSPITAL ENCOUNTER (OUTPATIENT)
Dept: PHYSICAL THERAPY | Age: 61
Discharge: HOME | End: 2021-10-20
Attending: PSYCHIATRY & NEUROLOGY
Payer: COMMERCIAL

## 2021-10-20 PROCEDURE — 97112 NEUROMUSCULAR REEDUCATION: CPT

## 2021-10-20 NOTE — PROGRESS NOTES
Bernardo Sanchez Plooalisson  : 1960  Primary: Niall Gómez  Secondary:  [address] at [place] [address] [facility], [address] [place] [address],[address], [address].  [phone]   [phone]        OUTPATIENT PHYSICAL THERAPY: Daily Treatment Note 10/20/2021  Visit Count:  20    ICD-10: Treatment Diagnosis:     Dizziness and giddiness (R42)   Other abnormalities of gait and mobility (R26.89)   Precautions/Allergies:   Adhesive tape-silicones and Pcn [penicillins]   TREATMENT PLAN:  Effective Dates: 10/5/2021 TO 2022 (90 days). Frequency/Duration: 2 times a week for 90 Day(s)    Pre-treatment Symptoms/Complaints:  10/20/2021: Patient reports she is doing pretty good, but is still weak from her bout with Covid. Pain: Initial: Pain Intensity 1: 0  Post Session:  0/10   Medications Last Reviewed:  10/20/2021  Updated Objective Findings:  None Today  TREATMENT:     NEUROMUSCULAR RE-EDUCATION: (40 minutes):  Exercise/activities per grid below to improve balance, coordination, kinesthetic sense, posture and proprioception. Required minimal visual and verbal cues to promote static and dynamic balance in standing. Balance/Vestibular Treatment:   Activity   Date  10/20/2021   Activity/Exercise   Sets/reps/equipment   Walking with head turns     4 laps   Walking with head up & down     4 laps   Step ups        Step taps     6 inch   2x10 reps   Marching   4 laps   Sidestepping   4 laps   Crossovers      [place]      Walking  backwards        Tandem walking   4 laps   Weaving in/out of cones     4 laps    Picking up cones        Stepping over half foam   10 reps forward  B LE  10 reps lateral B LE   Ball toss      Kick ball      Figure 8s       Circles right/left      Walking with 360 degree turns      Spirals      Weight shifting:    Left & Right     Not today:  Tiltboard   Weight shifting:   Forward & Backward      Not today:  Tiltboard   Static Standing Balance     Not today:  Tiltboard Standing with feet apart        Standing with feet together     Blue foams  5 seconds eyes closed  3 reps   Standing with feet semitandem   Blue foams  5 seconds eyes closed  3 reps each direction   Standing with feet tandem      Single leg stance          Treatment/Session Summary:    · Response to Treatment:  Patient tolerated treatment well with several standing rest breaks secondary to head spinning and pain. · Communication/Consultation:  None today  · Equipment provided today:  None today  · Recommendations/Intent for next treatment session: Next visit will focus on improving overall mobility.     Total Treatment Billable Duration:  40 minutes   PT Patient Time In/Time Out  Time In: 5675  Time Out: [address], PT    Visit # Therapist initials Date Arrived NS/ Cx < 24 hr >24 hr Cx Visit Comments   11  8/18/2021 X   No visit limit   12  8/24/2021 X      13  8/26/2021 X      14  8/31/2021 X        9/2/2021  X   Mom fell; at MDs office   15 JS 9/8/2021 X       16 PV 9/13/2021 X      17 JS 9/21/2021 X    Progress Note   18  9/24/2021 X       19 JS 9/28/2021 X        JS 10/1/2021   CX Covid   20 JS 10/20/2021  X                                                              Abbreviations: NS = No Show; CX = cancelled     Future Appointments   Date Time Provider Gladis Jackson   11/1/2021  8:45 AM Pio Dumas, PT [facility]   11/3/2021 11:00 AM Shital Rodney, PT [facility]   11/9/2021 11:00 AM Shital Rodney, PT [facility]   11/12/2021 11:00 AM Ko Dumas, PT [facility]   11/16/2021 10:15 AM Ko Dumas, PT [facility]   11/19/2021 11:00 AM Ko Dumas, PT [facility]   11/23/2021 11:00 AM Shital Rodney, PT [facility]   11/24/2021 11:00 AM Shital Rodney, PT [facility]   11/30/2021 11:00 AM Shital Rodney, PT [facility]   12/15/2021 10:00 AM Lenny Matthews MD [facility] BSNE

## 2021-10-20 NOTE — THERAPY RECERTIFICATION
Kenny Fong Ploof  : 1960  Primary: [facility]  Secondary:  [address] at [address], [address] [place] [address],[address], [address] [facility]  [phone]   [phone]          OUTPATIENT PHYSICAL THERAPY:Recertification    ICD-10: Treatment Diagnosis:     Dizziness and giddiness (R42)   Other abnormalities of gait and mobility (R26.89)   Precautions/Allergies:   Adhesive tape-silicones and Pcn [penicillins]   TREATMENT PLAN:  Effective Dates: 10/5/2021 TO 2022  (90 days). Frequency/Duration: 2 times a week for 90 Day(s) MEDICAL/REFERRING DIAGNOSIS:  Other abnormalities of gait and mobility [R26.89]   DATE OF ONSET: Chronic  REFERRING PHYSICIAN: Britta Matthews MD Orders: Evaluate and Treat  Return MD Appointment: TBD     ASSESSMENT:  Ms. Cleavon Ormond presents with improving mobility, strength, and safety with ambulation since initial evaluation. Patient has attended a total of 20 scheduled physical therapy visits including initial evaluation on 2021. Treatment has consisted of mobility and balance activities to improve overall safety and performance with activities of daily living. Patient is making steady progress with all aspects of therapy at this time. Patient was out sick with Covid but is doing much better now. After discussing with patient she agreed she would continue to benefit from physical therapy to improve overall mobility and balance. Please sign this re-certification if you concur. Thank you for the opportunity to serve this patient. PROBLEM LIST (Impacting functional limitations):  1. Decreased Strength  2. Decreased ADL/Functional Activities  3. Decreased Ambulation Ability/Technique  4. Decreased Balance  5. Decreased Activity Tolerance INTERVENTIONS PLANNED: (Treatment may consist of any combination of the following)  1. Balance Exercise  2. Gait Training  3. Home Exercise Program (HEP)  4.  Neuromuscular Re-education/Strengthening  5. Range of Motion (ROM)  6. Therapeutic Activites  7. Therapeutic Exercise/Strengthening     GOALS: (Goals have been discussed and agreed upon with patient.)  Short-Term Functional Goals: Time Frame: 30 days  1. Patient will be independent with home exercise program without exacerbation of symptoms or cueing needed--goal met. Discharge Goals: Time Frame: 90 days  1. Patient will be independent with all ADLs with minimal fear of falling and loss of balance with daily tasks--goal ongoing (stairs). 2. Patient will report no fear avoidance with social or recreational activities due to fear of falling--goal ongoing. 3. Patient will score greater than or equal to 50/56 on Bashir Balance Scale with minimal effect of imbalance on patient's ability to manage every day life activities--goal ongoing. 4. Patient will score greater than or equal to 25/30 on Functional Gait Assessment with minimal effect of imbalance on patient's ability to manage every day life activities--goal ongoing. OUTCOME MEASURE:   Tool Used: Bashir Balance Scale  Score:  Initial: 45/56 Most Recent: 46/56 (Date: 10/20/2021 )   Interpretation of Score: Each section is scored on a 0-4 scale, 0 representing the patients inability to perform the task and 4 representing independence. The scores of each section are added together for a total score of 56. The higher the patients score, the more independent the patient is. Any score below 45 indicates increased risk for falls. Tool Used: Functional Gait Assessment  Score:  Initial: 18/30  Most Recent: 20/30 (Date: 10/20/2021 )   Interpretation of Score: This test is a modification of the Dynamic Gait Index. It is a 10 item test with each item score 0-3 that assesses postural stability during various walking tasks.     MEDICAL NECESSITY:   · Patient is expected to demonstrate progress in strength, range of motion, balance, coordination and functional technique to improve safety during daily activities. · Patient demonstrates good rehab potential due to higher previous functional level. · Skilled intervention continues to be required due to decreased functional mobility. REASON FOR SERVICES/OTHER COMMENTS:  · Patient continues to demonstrate capacity to improve overall functional mobility which will increase independence and increase safety. Total Duration:  PT Patient Time In/Time Out  Time In: 1345  Time Out: 1430    Rehabilitation Potential For Stated Goals: Good   Regarding [de-identified] M Ploof's therapy, I certify that the treatment plan above will be carried out by a therapist or under their direction. Thank you for this referral,  Johnathon Segundo, PT     Referring Physician Signature: Keshia Baker,* _______________________________ Date _____________      PAIN/SUBJECTIVE:   Initial: Pain Intensity 1: 0  Post Session:  0/10   HISTORY:   History of Injury/Illness (Reason for Referral):  Patient reports she has been having balance issues for a while now. She reports she started out having pain in her leg and groin in her left leg. She reports that the pain is gone now, but she was scared she had had a CVA or has MS. She reports she has not fallen, but has just been off balance with ambulation at times. She reports she can't stand on her left leg to lift her right leg because she doesn't feel it will hold her. She reports that she also has some ringing in her ears that she feels is throwing off her balance as well. She reports she would like to work to improve her overall balance and mobility. 8/10/2021 (Progress Note): Patient reports she is feeling better overall, but still has some dizziness and times when her legs do not feel like they belong to her.    9/21/2021 (Progress Note): Patient reports she is feeling better. She reports she still has some days where the dizziness is bad, but it feels better overall.     29/89/6993 (Recertification): Patient reports she is doing pretty good, but is still weak from her bout with Covid. She reports she hasn't been able to do much. Past Medical History/Comorbidities:   Ms. Prema Acevedo  has a past medical history of Chronic obstructive pulmonary disease ([facility] Dystonia, H/O gastric bypass (12/9/2013 at [facility]), diabetes mellitus, Liver cirrhosis secondary to JEAN ([facility] Obese, Parkinson's disease ([facility] (dx--2010), RBD (REM behavioral disorder) (5/24/2018), RLS (restless legs syndrome), Thromboembolus ([facility] (2008), Unspecified adverse effect of anesthesia (3/2014 at [facility]), and Unspecified sleep apnea. She also has no past medical history of Difficult intubation, Malignant hyperthermia due to anesthesia, Nausea & vomiting, or Pseudocholinesterase deficiency. Ms. Prema Acevedo  has a past surgical history that includes hx lap cholecystectomy (3/2013); hx gastric bypass (12/2013 at [facility]); hx hernia repair (6/2013); and hx other surgical (3/2014 at [facility]). Social History/Living Environment:   Home Environment: Private residence  Living Alone: No  Support Systems: Family member(s), Friends \ neighbors   Prior Level of Function/Work/Activity:  Independent  Dominant Side:         RIGHT  Personal Factors:          Sex:  female        Age:  63 y.o. Current Medications:       Current Outpatient Medications:     citalopram (CELEXA) 40 mg tablet, Take 1 Tablet by mouth daily. , Disp: 90 Tablet, Rfl: 1    nystatin (MYCOSTATIN) powder, Apply twice daily, Disp: 60 g, Rfl: 2    mupirocin (BACTROBAN) 2 % ointment, Apply  to affected area daily. , Disp: 22 g, Rfl: 0    pantoprazole (Protonix) 40 mg tablet, Take 1 Tab by mouth daily. , Disp: 90 Tab, Rfl: 4    guaiFENesin-dextromethorphan SR (Mucinex DM) 600-30 mg per tablet, Take 1 Tab by mouth two (2) times a day., Disp: 60 Tab, Rfl: 1    ipratropium-albuterol (COMBIVENT RESPIMAT)  mcg/actuation inhaler, Take 1 Puff by inhalation four (4) times daily. , Disp: 1 Inhaler, Rfl: 6   Date Last Reviewed:  10/20/2021    EXAMINATION:   Observation/Orthostatic Postural Assessment:          Posture Assessment: Rounded shoulders, Forward head   Functional Mobility:   Gait/Mobility:    · Base of Support: Center of gravity altered  · Speed/Jammie: Pace decreased (<100 feet/min)  · Step Length: Left shortened, Right shortened  · Swing Pattern: Left symmetrical, Right symmetrical  · Gait Abnormalities: Antalgic (mild)  · Ambulation - Level of Assistance: Independent  · Interventions: Verbal cues, Safety awareness training          Transfers:     · Sit to Stand: Modified independent  · Stand to Sit: Independent  · Stand Pivot Transfers: Modified independent  · Bed to Chair: Modified independent  · Lateral Transfers: Modified independent         Bed Mobility:     · Rolling: Independent  · Supine to Sit: Independent  · Sit to Supine: Independent  · Scooting: Independent       Strength:          UE STRENGTH: 4/5 shoulder flexion, 4/5 shoulder abduction, 4/5 shoulder extension, 4/5 elbow flexion, 4/5 elbow extension. LE STRENGTH:  4-/5 hip flexion, 4-/5 hip abduction, 4-/5 hip adduction, 4-/5 hip extension, 4-/5 knee extension, 4-/5 knee flexion, 2+/5 ankle dorsiflexion, 2+/5 ankle plantarflexion, 2+/5 ankle inversion, 2+/5 ankle eversion.   Sensation:         Intact

## 2021-11-01 ENCOUNTER — HOSPITAL ENCOUNTER (OUTPATIENT)
Dept: PHYSICAL THERAPY | Age: 61
Discharge: HOME | End: 2021-11-01
Attending: PSYCHIATRY & NEUROLOGY
Payer: COMMERCIAL

## 2021-11-01 PROCEDURE — 97112 NEUROMUSCULAR REEDUCATION: CPT

## 2021-11-01 NOTE — PROGRESS NOTES
Guero Estevez  : 1960  Primary: Raman Gómez  Secondary:  [address] at [place] [address] [facility], [address] [place] [address],[address], [address] [facility]  [phone]   [phone]        OUTPATIENT PHYSICAL THERAPY: Daily Treatment Note 2021  Visit Count:  21    ICD-10: Treatment Diagnosis:     Dizziness and giddiness (R42)   Other abnormalities of gait and mobility (R26.89)   Precautions/Allergies:   Adhesive tape-silicones and Pcn [penicillins]   TREATMENT PLAN:  Effective Dates: 10/5/2021 TO 2022 (90 days). Frequency/Duration: 2 times a week for 90 Day(s)    Pre-treatment Symptoms/Complaints:  2021: Patient reports she feels okay today. \"I am still weak from COVID\". Pain: Initial: Pain Intensity 1: 0  Post Session:  0/10   Medications Last Reviewed:  2021  Updated Objective Findings:  None Today  TREATMENT:     NEUROMUSCULAR RE-EDUCATION: (40 minutes):  Exercise/activities per grid below to improve balance, coordination, kinesthetic sense, posture and proprioception. Required minimal visual and verbal cues to promote static and dynamic balance in standing. Balance/Vestibular Treatment:   Activity   Date  2021   Activity/Exercise   Sets/reps/equipment   Walking with head turns     4 laps    Walking with head up & down     4 laps   Step ups        Step taps     6 inch   10 reps  crosstaps   10 reps   Marching   4 laps   Sidestepping   4 laps   Crossovers      [place]      Walking  backwards        Tandem walking   4 laps   Weaving in/out of cones     4 laps    Picking up cones        Stepping over half foam   10 reps forward  B LE  10 reps lateral B LE   Ball toss      Kick ball      Figure 8s       Circles right/left      Walking with 360 degree turns      Spirals      Weight shifting:    Left & Right      Tiltboard   Weight shifting:   Forward & Backward      Tiltboard   Static Standing Balance     Not today:  Tiltboard   Standing with feet apart Standing with feet together     Blue foams  5 seconds eyes closed  3 reps   Standing with feet semitandem   Blue foams  5 seconds eyes closed  3 reps each direction   Standing with feet tandem      Single leg stance          Treatment/Session Summary:    · Response to Treatment:  Patient needed several rest breaks due to increased fatigue and some shortness of breath. · Communication/Consultation:  None today  · Equipment provided today:  None today  · Recommendations/Intent for next treatment session: Next visit will focus on improving overall mobility.     Total Treatment Billable Duration:  40 minutes   PT Patient Time In/Time Out  Time In: 0845  Time Out: [address] PT    Visit # Therapist initials Date Arrived NS/ Cx < 24 hr >24 hr Cx Visit Comments   11 JS 8/18/2021 X   No visit limit   12 JS 8/24/2021 X      13 JS 8/26/2021 X      14 JS 8/31/2021 X       JS 9/2/2021  X   Mom fell; at MDs office   15 JS 9/8/2021 X       16 PV 9/13/2021 X      17 JS 9/21/2021 X    Progress Note   18 JS 9/24/2021 X       19 JS 9/28/2021 X        JS 10/1/2021   CX Covid   20 JS 10/20/2021  X      21 PV 11/1/2021 X                                                     Abbreviations: NS = No Show; CX = cancelled     Future Appointments   Date Time Provider Gladis Jackson   11/3/2021 11:00 AM Olga Cadet, PT [facility]   11/9/2021 11:00 AM Olga Cadet, PT [facility]   11/12/2021 11:00 AM Billy Dumas, PT [facility]   11/16/2021 10:15 AM Billy Dumas, PT [facility]   11/19/2021 11:00 AM Billy Dumas, PT [facility]   11/23/2021 11:00 AM Olga Cadet, PT [facility]   11/24/2021 11:00 AM Olga Cadet, PT [facility]   11/30/2021 11:00 AM Olga Cadet, PT [facility]   12/15/2021 10:00 AM Beti Matthews MD [facility]

## 2021-11-09 ENCOUNTER — HOSPITAL ENCOUNTER (OUTPATIENT)
Dept: PHYSICAL THERAPY | Age: 61
Discharge: HOME | End: 2021-11-09
Attending: PSYCHIATRY & NEUROLOGY
Payer: COMMERCIAL

## 2021-11-09 NOTE — PROGRESS NOTES
Elpidio Yelitza Arshadooalisson  : 1960  Primary: Jeff Abraham [facility]  Secondary:  [address] [id number] [facility], [address] [place] [address],[address], [address] [facility]  [phone]   [phone]          OUTPATIENT DAILY NOTE    NAME/AGE/GENDER: Regine Lindsey is a 63 y.o. female. DATE: 2021    Physical therapist had to cancel patient's  appointment for today due to emergency at therapist's house. Will plan to follow up on next scheduled visit.     Paxton Jeronimo, SONAL    Future Appointments   Date Time Provider Gladis Jackson   2021 11:00 AM Asuncion Traylor PT [facility]   2021 10:15 AM Rema Dumas, PT [facility]   2021 11:00 AM Rema Dumas, PT [facility]   2021 11:00 AM Ottis Mylaesther, PT [facility]   2021 11:00 AM Ottis Mylar, PT [facility]   2021 11:00 AM Ottis Mylar, PT [facility]   12/15/2021 10:00 AM Janine Matthews MD [facility]

## 2021-11-12 ENCOUNTER — HOSPITAL ENCOUNTER (OUTPATIENT)
Dept: PHYSICAL THERAPY | Age: 61
Discharge: HOME | End: 2021-11-12
Attending: PSYCHIATRY & NEUROLOGY
Payer: COMMERCIAL

## 2021-11-12 PROCEDURE — 97112 NEUROMUSCULAR REEDUCATION: CPT

## 2021-11-12 NOTE — PROGRESS NOTES
Guero Estevez  : 1960  Primary: Raman [facility]az[facility]  Secondary:  [address] at [address], [address] [place] [address],[address], [place] [address].  [phone]   [phone]        OUTPATIENT PHYSICAL THERAPY: Daily Treatment Note 2021  Visit Count:  22    ICD-10: Treatment Diagnosis:     Dizziness and giddiness (R42)   Other abnormalities of gait and mobility (R26.89)   Precautions/Allergies:   Adhesive tape-silicones and Pcn [penicillins]   TREATMENT PLAN:  Effective Dates: 10/5/2021 TO 2022 (90 days). Frequency/Duration: 2 times a week for 90 Day(s)    Pre-treatment Symptoms/Complaints:  2021: \" was not a good day. I was very dizzy. I was better by Monday\". Pain: Initial: Pain Intensity 1: 0  Post Session:  0/10   Medications Last Reviewed:  2021  Updated Objective Findings:  None Today  TREATMENT:     NEUROMUSCULAR RE-EDUCATION: (40 minutes):  Exercise/activities per grid below to improve balance, coordination, kinesthetic sense, posture and proprioception. Required minimal visual and verbal cues to promote static and dynamic balance in standing. Balance/Vestibular Treatment:   Activity   Date  2021   Activity/Exercise   Sets/reps/equipment   Walking with head turns     4 laps    Walking with head up & down     4 laps   Step ups        Step taps     6 inch   10 reps  crosstaps   10 reps   Marching   4 laps   Sidestepping   4 laps   Crossovers      [place]      Walking  backwards        Tandem walking   4 laps   Weaving in/out of cones     4 laps    Picking up cones        Stepping over half foam   10 reps forward  B LE  10 reps lateral B LE   Ball toss      Kick ball      Figure 8s       Circles right/left      Walking with 360 degree turns      Spirals      Weight shifting:    Left & Right      Tiltboard   Weight shifting:   Forward & Backward      Tiltboard   Static Standing Balance     Not today:  Tiltboard   Standing with feet apart        Standing with feet together     Blue foams  5 seconds eyes closed  3 reps   Standing with feet semitandem   Blue foams  5 seconds eyes closed  3 reps each direction   Standing with feet tandem      Single leg stance          Treatment/Session Summary:    · Response to Treatment:  Patient tolerated exercises without complaints. · Communication/Consultation:  None today  · Equipment provided today:  None today  · Recommendations/Intent for next treatment session: Next visit will focus on improving overall mobility.     Total Treatment Billable Duration:  40 minutes   PT Patient Time In/Time Out  Time In: 1100  Time Out: [address], PT    Visit # Therapist initials Date Arrived NS/ Cx < 24 hr >24 hr Cx Visit Comments   11 JS 8/18/2021 X   No visit limit   12 JS 8/24/2021 X      13 JS 8/26/2021 X      14 JS 8/31/2021 X       JS 9/2/2021  X   Mom fell; at MDs office   15 JS 9/8/2021 X       16 PV 9/13/2021 X      17 JS 9/21/2021 X    Progress Note   18 JS 9/24/2021 X       19 JS 9/28/2021 X        JS 10/1/2021   CX Covid   20 JS 10/20/2021  X      21 PV 11/1/2021 X      22 JS 11/3/2021 X       23 PV 11/12/2021 X                                   Abbreviations: NS = No Show; CX = cancelled     Future Appointments   Date Time Provider Gladis Jackson   11/16/2021 10:15 AM Cassie Dumas, PT [facility]   11/19/2021 11:00 AM Matthew Dumas, PT [facility]   11/23/2021 11:00 AM Riley Catching, PT [facility]   11/24/2021 11:00 AM Riley Catching, PT [facility]   11/30/2021 11:00 AM Riley Catching, PT [facility]   12/15/2021 10:00 AM Randa Matthews MD [facility]

## 2021-11-16 ENCOUNTER — HOSPITAL ENCOUNTER (OUTPATIENT)
Dept: PHYSICAL THERAPY | Age: 61
Discharge: HOME | End: 2021-11-16
Attending: PSYCHIATRY & NEUROLOGY
Payer: COMMERCIAL

## 2021-11-16 PROCEDURE — 97112 NEUROMUSCULAR REEDUCATION: CPT

## 2021-11-16 NOTE — PROGRESS NOTES
[facility] Ploof  : 1960  Primary: Dilshad Kay [facility]  Secondary:  [address] at [address], [address] [place] [address],[address], [place] [address].  [phone]   [phone]        OUTPATIENT PHYSICAL THERAPY: Daily Treatment Note 2021  Visit Count:  23    ICD-10: Treatment Diagnosis:     Dizziness and giddiness (R42)   Other abnormalities of gait and mobility (R26.89)   Precautions/Allergies:   Adhesive tape-silicones and Pcn [penicillins]   TREATMENT PLAN:  Effective Dates: 10/5/2021 TO 2022 (90 days). Frequency/Duration: 2 times a week for 90 Day(s)    Pre-treatment Symptoms/Complaints:  2021: \"Doing okay\". Pain: Initial: Pain Intensity 1: 0  Post Session:  0/10   Medications Last Reviewed:  2021  Updated Objective Findings:  None Today  TREATMENT:     NEUROMUSCULAR RE-EDUCATION: (45 minutes):  Exercise/activities per grid below to improve balance, coordination, kinesthetic sense, posture and proprioception. Required minimal visual and verbal cues to promote static and dynamic balance in standing. Balance/Vestibular Treatment:   Activity   Date  2021   Activity/Exercise   Sets/reps/equipment   Walking with head turns     4 laps    Walking with head up & down     4 laps   Step ups        Step taps     6 inch   10 reps  crosstaps   10 reps   Marching   4 laps   Sidestepping   4 laps   Crossovers      [place]      Walking  backwards        Tandem walking   4 laps   Weaving in/out of cones     4 laps    Picking up cones        Stepping over half foam   10 reps forward  B LE  10 reps lateral B LE   Ball toss      Kick ball      Figure 8s       Circles right/left      Walking with 360 degree turns      Spirals      Weight shifting:    Left & Right      Tiltboard   Weight shifting:   Forward & Backward      Tiltboard   Static Standing Balance     Tiltboard eyes open and eyes closed   Standing with feet apart        Standing with feet together Blue foams  5 seconds eyes closed  3 reps   Standing with feet semitandem   Blue foams  5 seconds eyes closed  3 reps each direction   Standing with feet tandem      Single leg stance          Treatment/Session Summary:    · Response to Treatment:  Patient tolerated exercises without issues. · Communication/Consultation:  None today  · Equipment provided today:  None today  · Recommendations/Intent for next treatment session: Next visit will focus on improving overall mobility.     Total Treatment Billable Duration:  45 minutes   PT Patient Time In/Time Out  Time In: 1015  Time Out: [address], PT    Visit # Therapist initials Date Arrived NS/ Cx < 24 hr >24 hr Cx Visit Comments   11 JS 8/18/2021 X   No visit limit   12 JS 8/24/2021 X      13 JS 8/26/2021 X      14 JS 8/31/2021 X       JS 9/2/2021  X   Mom fell; at MDs office   15 JS 9/8/2021 X       16 PV 9/13/2021 X      17 JS 9/21/2021 X    Progress Note   18 JS 9/24/2021 X       19 JS 9/28/2021 X        JS 10/1/2021   CX Covid   20 JS 10/20/2021  X      21 PV 11/1/2021 X      22 JS 11/3/2021 X       23 PV 11/12/2021 X      24 PV 11/16/2021 X                          Abbreviations: NS = No Show; CX = cancelled     Future Appointments   Date Time Provider Gladis Jackson   11/19/2021 11:00 AM Vermillion Sagely, PT [facility]   11/23/2021 11:00 AM Retia Olive, PT [facility]   11/24/2021 11:00 AM Retia Olive, PT [facility]   11/30/2021 11:00 AM Retia Olive, PT [facility]   12/15/2021 10:00 AM Becky Matthews MD [facility]

## 2021-11-19 ENCOUNTER — HOSPITAL ENCOUNTER (OUTPATIENT)
Dept: PHYSICAL THERAPY | Age: 61
Discharge: HOME | End: 2021-11-19
Attending: PSYCHIATRY & NEUROLOGY
Payer: COMMERCIAL

## 2021-11-19 PROCEDURE — 97112 NEUROMUSCULAR REEDUCATION: CPT

## 2021-11-19 NOTE — PROGRESS NOTES
Westley Reading Pavithraooalisson  : 1960  Primary: [facility]  Secondary:  [address] at [address], [address] [place] [address],[address], [place] [address].  [phone]   [phone]        OUTPATIENT PHYSICAL THERAPY: Daily Treatment Note 2021  Visit Count:  24    ICD-10: Treatment Diagnosis:     Dizziness and giddiness (R42)   Other abnormalities of gait and mobility (R26.89)   Precautions/Allergies:   Adhesive tape-silicones and Pcn [penicillins]   TREATMENT PLAN:  Effective Dates: 10/5/2021 TO 2022 (90 days). Frequency/Duration: 2 times a week for 90 Day(s)    Pre-treatment Symptoms/Complaints:  2021: Patient has no complaints. Pain: Initial: Pain Intensity 1: 0  Post Session:  0/10   Medications Last Reviewed:  2021  Updated Objective Findings:  None Today  TREATMENT:     NEUROMUSCULAR RE-EDUCATION: (40 minutes):  Exercise/activities per grid below to improve balance, coordination, kinesthetic sense, posture and proprioception. Required minimal visual and verbal cues to promote static and dynamic balance in standing. Balance/Vestibular Treatment:   Activity   Date  2021   Activity/Exercise   Sets/reps/equipment   Walking with head turns     4 laps    Walking with head up & down     4 laps   Step ups        Step taps     6 inch   10 reps  crosstaps   10 reps   Marching   4 laps   Sidestepping   4 laps   Crossovers      [place]      Walking  backwards        Tandem walking   4 laps   Weaving in/out of cones     4 laps    Picking up cones        Stepping over half foam   10 reps forward  B LE  10 reps lateral B LE   Ball toss      Kick ball      Figure 8s       Circles right/left      Walking with 360 degree turns      Spirals      Weight shifting:    Left & Right      Tiltboard   Weight shifting:   Forward & Backward      Tiltboard   Static Standing Balance     Tiltboard eyes open and eyes closed   Standing with feet apart        Standing with feet together     Blue foams  5 seconds eyes closed  3 reps   Standing with feet semitandem   Blue foams  5 seconds eyes closed  3 reps each direction   Standing with feet tandem      Single leg stance          Treatment/Session Summary:    · Response to Treatment:  Patient reports increased dizziness with exercises. Patient needed several rest breaks due to dizziness. · Communication/Consultation:  None today  · Equipment provided today:  None today  · Recommendations/Intent for next treatment session: Next visit will focus on improving overall mobility.     Total Treatment Billable Duration:  45 minutes   PT Patient Time In/Time Out  Time In: 1100  Time Out: [address], PT    Visit # Therapist initials Date Arrived NS/ Cx < 24 hr >24 hr Cx Visit Comments   11 JS 8/18/2021 X   No visit limit   12 JS 8/24/2021 X      13 JS 8/26/2021 X      14 JS 8/31/2021 X       JS 9/2/2021  X   Mom fell; at MDs office   15 JS 9/8/2021 X       16 PV 9/13/2021 X      17 JS 9/21/2021 X    Progress Note   18 JS 9/24/2021 X       19 JS 9/28/2021 X        JS 10/1/2021   CX Covid   20 JS 10/20/2021  X      21 PV 11/1/2021 X      22 JS 11/3/2021 X       23 PV 11/12/2021 X      24 PV 11/16/2021 X      25 PV 11/19/2021 X                 Abbreviations: NS = No Show; CX = cancelled     Future Appointments   Date Time Provider Gladis Renetta   11/23/2021 11:00 AM Olga Cadet, PT [facility]   11/24/2021  7:00 PM Olga Cadet, PT [facility]   11/30/2021 11:00 AM Olga Cadet, PT [facility]   12/15/2021 10:00 AM Beti Matthews MD [facility]

## 2021-11-23 ENCOUNTER — HOSPITAL ENCOUNTER (OUTPATIENT)
Dept: PHYSICAL THERAPY | Age: 61
Discharge: HOME | End: 2021-11-23
Attending: PSYCHIATRY & NEUROLOGY
Payer: COMMERCIAL

## 2021-11-23 PROCEDURE — 97112 NEUROMUSCULAR REEDUCATION: CPT

## 2021-11-23 NOTE — PROGRESS NOTES
Parrish Villalta Ploof  : 1960  Primary: [facility]  Secondary:  [facility] [place] [address] [facility], [address] [place] [address],[address], [place] [address].  [phone]   [phone]        OUTPATIENT PHYSICAL THERAPY: Daily Treatment Note 2021  Visit Count:  25    ICD-10: Treatment Diagnosis:     Dizziness and giddiness (R42)   Other abnormalities of gait and mobility (R26.89)   Precautions/Allergies:   Adhesive tape-silicones and Pcn [penicillins]   TREATMENT PLAN:  Effective Dates: 10/5/2021 TO 2022 (90 days). Frequency/Duration: 2 times a week for 90 Day(s)    Pre-treatment Symptoms/Complaints:  2021: Patient reports her neck hurts today. Pain: Initial: Pain Intensity 1: 0  Post Session:  0/10   Medications Last Reviewed:  2021  Updated Objective Findings:  None Today  TREATMENT:     NEUROMUSCULAR RE-EDUCATION: (40 minutes):  Exercise/activities per grid below to improve balance, coordination, kinesthetic sense, posture and proprioception. Required minimal visual and verbal cues to promote static and dynamic balance in standing. Balance/Vestibular Treatment:   Activity   Date  2021   Activity/Exercise   Sets/reps/equipment   Walking with head turns     4 laps    Walking with head up & down     4 laps   Step ups        Step taps     6 inch   10 reps  crosstaps   10 reps   Marching   4 laps   Sidestepping   4 laps   Crossovers      [place]      Walking  backwards        Tandem walking   4 laps   Weaving in/out of cones     4 laps    Picking up cones        Stepping over half foam   10 reps forward  B LE  10 reps lateral B LE   Ball toss      Kick ball      Figure 8s       Circles right/left      Walking with 360 degree turns      Spirals      Weight shifting:    Left & Right      Tiltboard   Weight shifting:   Forward & Backward      Tiltboard   Static Standing Balance     Tiltboard eyes open and eyes closed   Standing with feet apart        Standing with feet together     Blue foams  5 seconds eyes closed  3 reps   Standing with feet semitandem   Blue foams  5 seconds eyes closed  3 reps each direction   Standing with feet tandem      Single leg stance          Treatment/Session Summary:    · Response to Treatment:  Patient tolerated treatment well with improving overall mobility. · Communication/Consultation:  None today  · Equipment provided today:  None today  · Recommendations/Intent for next treatment session: Next visit will focus on improving overall mobility.     Total Treatment Billable Duration:  45 minutes   PT Patient Time In/Time Out  Time In: 1100  Time Out: [address] [facility], PT    Visit # Therapist initials Date Arrived NS/ Cx < 24 hr >24 hr Cx Visit Comments   11 JS 8/18/2021 X   No visit limit   12 JS 8/24/2021 X      13 JS 8/26/2021 X      14 JS 8/31/2021 X       JS 9/2/2021  X   Mom fell; at MDs office   15 JS 9/8/2021 X       16 PV 9/13/2021 X      17 JS 9/21/2021 X    Progress Note   18 JS 9/24/2021 X       19 JS 9/28/2021 X        JS 10/1/2021   CX Covid   20 JS 10/05/5409  X   Recert   21 PV 89/3/4906 X      22 JS 11/3/2021 X       23 PV 11/12/2021 X      24 PV 11/16/2021 X      25 PV 11/19/2021 X      26 JS 11/23/2021 X         Abbreviations: NS = No Show; CX = cancelled     Future Appointments   Date Time Provider Gladis Renetta   11/24/2021  7:00 PM Bret Wesley PT [facility]   11/30/2021 11:00 AM Bret Wesley PT [facility]   12/15/2021 10:00 AM Manuel Matthews MD [facility]

## 2021-11-24 ENCOUNTER — HOSPITAL ENCOUNTER (OUTPATIENT)
Dept: PHYSICAL THERAPY | Age: 61
Discharge: HOME | End: 2021-11-24
Attending: PSYCHIATRY & NEUROLOGY
Payer: COMMERCIAL

## 2021-11-30 ENCOUNTER — HOSPITAL ENCOUNTER (OUTPATIENT)
Dept: PHYSICAL THERAPY | Age: 61
Discharge: HOME | End: 2021-11-30
Attending: PSYCHIATRY & NEUROLOGY
Payer: COMMERCIAL

## 2021-11-30 PROCEDURE — 97112 NEUROMUSCULAR REEDUCATION: CPT

## 2021-11-30 NOTE — PROGRESS NOTES
Gold Bean Ploof  : 1960  Primary: Radha [facility]  Secondary:  [address] at [address], [address] [place] [address],[address], [facility] [address]  [phone]   [phone]        OUTPATIENT PHYSICAL THERAPY: Daily Treatment Note 2021  Visit Count:  26    ICD-10: Treatment Diagnosis:     Dizziness and giddiness (R42)   Other abnormalities of gait and mobility (R26.89)   Precautions/Allergies:   Adhesive tape-silicones and Pcn [penicillins]   TREATMENT PLAN:  Effective Dates: 10/5/2021 TO 2022 (90 days). Frequency/Duration: 2 times a week for 90 Day(s)    Pre-treatment Symptoms/Complaints:  2021: Patient reports she is already dizzy today. Pain: Initial: Pain Intensity 1: 0  Post Session:  0/10   Medications Last Reviewed:  2021  Updated Objective Findings:  None Today  TREATMENT:     NEUROMUSCULAR RE-EDUCATION: (40 minutes):  Exercise/activities per grid below to improve balance, coordination, kinesthetic sense, posture and proprioception. Required minimal visual and verbal cues to promote static and dynamic balance in standing. Balance/Vestibular Treatment:   Activity   Date  2021   Activity/Exercise   Sets/reps/equipment   Walking with head turns     4 laps    Walking with head up & down     4 laps   Step ups        Step taps     6 inch   10 reps  crosstaps   10 reps   Marching   4 laps   Sidestepping   4 laps   Crossovers      [place]      Walking  backwards        Tandem walking   4 laps   Weaving in/out of cones     4 laps    Picking up cones        Stepping over half foam   Not today:  10 reps forward  B LE  10 reps lateral B LE   Ball toss      Kick ball      Figure 8s       Circles right/left      Walking with 360 degree turns      Spirals      Weight shifting:    Left & Right      Not today:  Tiltboard   Weight shifting:   Forward & Backward      Not today:  Tiltboard   Static Standing Balance     Tiltboard eyes open and eyes closed Standing with feet apart        Standing with feet together     Not today:  Blue foams  5 seconds eyes closed  3 reps   Standing with feet semitandem   Not today:  Blue foams  5 seconds eyes closed  3 reps each direction   Standing with feet tandem      Single leg stance      VOR Standing head turns  5 reps  2 sets    Seated head nods  5 reps  2 sets  HEP       Treatment/Session Summary:    · Response to Treatment:  Patient tolerated treatment but needed several sitting rest breaks today due to dizziness. · Communication/Consultation:  None today  · Equipment provided today:  None today  · Recommendations/Intent for next treatment session: Next visit will focus on improving overall mobility.     Total Treatment Billable Duration:  45 minutes   PT Patient Time In/Time Out  Time In: 1100  Time Out: [address] [facility], PT    Visit # Therapist initials Date Arrived NS/ Cx < 24 hr >24 hr Cx Visit Comments   11  8/18/2021 X   No visit limit   12 JS 8/24/2021 X      13 JS 8/26/2021 X      14 JS 8/31/2021 X       JS 9/2/2021  X   Mom fell; at MDs office   15 JS 9/8/2021 X       16 PV 9/13/2021 X      17 JS 9/21/2021 X    Progress Note   18 JS 9/24/2021 X       19 JS 9/28/2021 X        JS 10/1/2021   CX Covid   20 JS 68/47/7648  X   Recert   21 PV 68/4/5043 X      22 JS 11/3/2021 X       23 PV 11/12/2021 X      24 PV 11/16/2021 X      25 PV 11/19/2021 X      26 JS 11/23/2021 X       27 JS 11/30/2021 X         Abbreviations: NS = No Show; CX = cancelled     Future Appointments   Date Time Provider Gladis Jackson   11/30/2021 11:00 AM Cezar Rizo, PT [facility]   12/15/2021 10:00 AM Jeyson Matthews MD [facility]

## 2021-12-06 ENCOUNTER — HOSPITAL ENCOUNTER (OUTPATIENT)
Dept: PHYSICAL THERAPY | Age: 61
Discharge: HOME | End: 2021-12-06
Attending: PSYCHIATRY & NEUROLOGY
Payer: COMMERCIAL

## 2021-12-06 NOTE — PROGRESS NOTES
Lex Javier Nitzaalisson  : 1960  Primary: Damon Gómez  Secondary:  [address] at [place] [address] [place] [address],[address], [facility] [address]  [phone]   [phone]          OUTPATIENT DAILY NOTE    NAME/AGE/GENDER: Nikki Brock is a 63 y.o. female. DATE: 2021    Patient cancelled (less than 24 hours ago) her appointment for today due to unknown reasons. Will plan to follow up on next scheduled visit.     Phil March, PT    Future Appointments   Date Time Provider Gladis Jackson   2021  4:45 PM Gaurav Waldrop PT [facility]   2021  3:15 PM Gaurav Waldrop, PT [facility]   12/15/2021 10:00 AM Cesar Matthews MD [facility]   2021  4:45 PM Gaurav Waldrop, PT [facility]   2021  4:45 PM Gaurav Waldrop, PT [facility]   2021  3:15 PM Vissage, Amber Lapping, PT [facility]   2021  3:15 PM Vissage, Amber Lapping, PT [facility]

## 2021-12-09 ENCOUNTER — HOSPITAL ENCOUNTER (OUTPATIENT)
Dept: PHYSICAL THERAPY | Age: 61
Discharge: HOME | End: 2021-12-09
Attending: PSYCHIATRY & NEUROLOGY
Payer: COMMERCIAL

## 2021-12-09 PROCEDURE — 97112 NEUROMUSCULAR REEDUCATION: CPT

## 2021-12-09 NOTE — PROGRESS NOTES
Cristina Alvarenga Ploof  : 1960  Primary: Robby Gómez  Secondary:  [address] at [facility] [address] [facility], [facility] [address]  [phone]   [phone]          OUTPATIENT PHYSICAL THERAPY:Progress Report 2021   ICD-10: Treatment Diagnosis:     Dizziness and giddiness (R42)   Other abnormalities of gait and mobility (R26.89)   Precautions/Allergies:   Adhesive tape-silicones and Pcn [penicillins]   TREATMENT PLAN:  Effective Dates: 10/5/2021 TO 2022  (90 days). Frequency/Duration: 2 times a week for 90 Day(s) MEDICAL/REFERRING DIAGNOSIS:  Other abnormalities of gait and mobility [R26.89]   DATE OF ONSET: Chronic  REFERRING PHYSICIAN: Alicia Matthews MD Orders: Evaluate and Treat  Return MD Appointment: TBD     ASSESSMENT:  Ms. Salazar Walton presents with improving mobility, strength, and safety with ambulation since initial evaluation. Patient has attended a total of 27 scheduled physical therapy visits including initial evaluation on 2021. Treatment has consisted of mobility and balance activities to improve overall safety and performance with activities of daily living. Patient is making steady progress with all aspects of therapy at this time. PROBLEM LIST (Impacting functional limitations):  1. Decreased Strength  2. Decreased ADL/Functional Activities  3. Decreased Ambulation Ability/Technique  4. Decreased Balance  5. Decreased Activity Tolerance INTERVENTIONS PLANNED: (Treatment may consist of any combination of the following)  1. Balance Exercise  2. Gait Training  3. Home Exercise Program (HEP)  4. Neuromuscular Re-education/Strengthening  5. Range of Motion (ROM)  6. Therapeutic Activites  7. Therapeutic Exercise/Strengthening     GOALS: (Goals have been discussed and agreed upon with patient.)  Short-Term Functional Goals: Time Frame: 30 days  1.  Patient will be independent with home exercise program without exacerbation of symptoms or cueing needed--goal met. Discharge Goals: Time Frame: 90 days  1. Patient will be independent with all ADLs with minimal fear of falling and loss of balance with daily tasks--goal ongoing (stairs). 2. Patient will report no fear avoidance with social or recreational activities due to fear of falling--goal ongoing. 3. Patient will score greater than or equal to 50/56 on Bashir Balance Scale with minimal effect of imbalance on patient's ability to manage every day life activities--goal ongoing. 4. Patient will score greater than or equal to 25/30 on Functional Gait Assessment with minimal effect of imbalance on patient's ability to manage every day life activities--goal ongoing. OUTCOME MEASURE:   Tool Used: Bashir Balance Scale  Score:  Initial: 45/56 Most Recent: 46/56 (Date: 12/9/2021 )   Interpretation of Score: Each section is scored on a 0-4 scale, 0 representing the patients inability to perform the task and 4 representing independence. The scores of each section are added together for a total score of 56. The higher the patients score, the more independent the patient is. Any score below 45 indicates increased risk for falls. Tool Used: Functional Gait Assessment  Score:  Initial: 18/30  Most Recent: 20/30 (Date: 12/9/2021 )   Interpretation of Score: This test is a modification of the Dynamic Gait Index. It is a 10 item test with each item score 0-3 that assesses postural stability during various walking tasks. MEDICAL NECESSITY:   · Patient is expected to demonstrate progress in strength, range of motion, balance, coordination and functional technique to improve safety during daily activities. · Patient demonstrates good rehab potential due to higher previous functional level. · Skilled intervention continues to be required due to decreased functional mobility.   REASON FOR SERVICES/OTHER COMMENTS:  · Patient continues to demonstrate capacity to improve overall functional mobility which will increase independence and increase safety. Total Duration:  PT Patient Time In/Time Out  Time In: 1645  Time Out: 1730    Rehabilitation Potential For Stated Goals: Good      PAIN/SUBJECTIVE:   Initial: Pain Intensity 1: 0  Post Session:  0/10   HISTORY:   History of Injury/Illness (Reason for Referral):  Patient reports she has been having balance issues for a while now. She reports she started out having pain in her leg and groin in her left leg. She reports that the pain is gone now, but she was scared she had had a CVA or has MS. She reports she has not fallen, but has just been off balance with ambulation at times. She reports she can't stand on her left leg to lift her right leg because she doesn't feel it will hold her. She reports that she also has some ringing in her ears that she feels is throwing off her balance as well. She reports she would like to work to improve her overall balance and mobility. 8/10/2021 (Progress Note): Patient reports she is feeling better overall, but still has some dizziness and times when her legs do not feel like they belong to her.    9/21/2021 (Progress Note): Patient reports she is feeling better. She reports she still has some days where the dizziness is bad, but it feels better overall. 56/02/3834 (Recertification): Patient reports she is doing pretty good, but is still weak from her bout with Covid. She reports she hasn't been able to do much. 12/9/2021 (Progress Note): Patient reports she is doing well. She reports more good days with her balance than bad, but she still has difficulty with stairs.     Past Medical History/Comorbidities:   Ms. Mook Shea  has a past medical history of Chronic obstructive pulmonary disease ([facility] Dystonia, H/O gastric bypass (12/9/2013 at [facility]), diabetes mellitus, Liver cirrhosis secondary to [facility]), Obese, Parkinson's disease ([facility] (dx--2010), RBD (REM behavioral disorder) (5/24/2018), RLS (restless legs syndrome), Thromboembolus ([facility] [address] (2008), Unspecified adverse effect of anesthesia (3/2014 at [facility]), and Unspecified sleep apnea. She also has no past medical history of Difficult intubation, Malignant hyperthermia due to anesthesia, Nausea & vomiting, or Pseudocholinesterase deficiency. Ms. Lawyer Severance  has a past surgical history that includes hx lap cholecystectomy (3/2013); hx gastric bypass (12/2013 at [facility]); hx hernia repair (6/2013); and hx other surgical (3/2014 at [facility]). Social History/Living Environment:   Home Environment: Private residence  Living Alone: No  Support Systems: Family member(s), Friends \ neighbors   Prior Level of Function/Work/Activity:  Independent  Dominant Side:         RIGHT  Personal Factors:          Sex:  female        Age:  63 y.o. Current Medications:       Current Outpatient Medications:     citalopram (CELEXA) 40 mg tablet, Take 1 Tablet by mouth daily. , Disp: 90 Tablet, Rfl: 1    nystatin (MYCOSTATIN) powder, Apply twice daily, Disp: 60 g, Rfl: 2    mupirocin (BACTROBAN) 2 % ointment, Apply  to affected area daily. , Disp: 22 g, Rfl: 0    pantoprazole (Protonix) 40 mg tablet, Take 1 Tab by mouth daily. , Disp: 90 Tab, Rfl: 4    guaiFENesin-dextromethorphan SR (Mucinex DM) 600-30 mg per tablet, Take 1 Tab by mouth two (2) times a day., Disp: 60 Tab, Rfl: 1    ipratropium-albuterol (COMBIVENT RESPIMAT)  mcg/actuation inhaler, Take 1 Puff by inhalation four (4) times daily. , Disp: 1 Inhaler, Rfl: 6   Date Last Reviewed:  12/9/2021    EXAMINATION:   Observation/Orthostatic Postural Assessment:          Posture Assessment: Rounded shoulders, Forward head   Functional Mobility:   Gait/Mobility:    · Base of Support: Center of gravity altered  · Speed/Jammie: Pace decreased (<100 feet/min)  · Step Length: Left shortened, Right shortened  · Swing Pattern: Left symmetrical, Right symmetrical  · Gait Abnormalities: Antalgic (mild)  · Ambulation - Level of Assistance: Independent  · Interventions: Verbal cues, Safety awareness training          Transfers:     · Sit to Stand: Modified independent  · Stand to Sit: Independent  · Stand Pivot Transfers: Modified independent  · Bed to Chair: Modified independent  · Lateral Transfers: Modified independent         Bed Mobility:     · Rolling: Independent  · Supine to Sit: Independent  · Sit to Supine: Independent  · Scooting: Independent       Strength:          UE STRENGTH: 4/5 shoulder flexion, 4/5 shoulder abduction, 4/5 shoulder extension, 4/5 elbow flexion, 4/5 elbow extension. LE STRENGTH:  4-/5 hip flexion, 4-/5 hip abduction, 4-/5 hip adduction, 4-/5 hip extension, 4-/5 knee extension, 4-/5 knee flexion, 2+/5 ankle dorsiflexion, 2+/5 ankle plantarflexion, 2+/5 ankle inversion, 2+/5 ankle eversion.   Sensation:         Intact

## 2021-12-09 NOTE — PROGRESS NOTES
Lucy Arita Ploof  : 1960  Primary: Cal Nino [facility]  Secondary:  [address] at [place] [id number] [facility], [address] [place] [address],[address], [address] [facility]  [phone]   [phone]        OUTPATIENT PHYSICAL THERAPY: Daily Treatment Note 2021  Visit Count:  27    ICD-10: Treatment Diagnosis:     Dizziness and giddiness (R42)   Other abnormalities of gait and mobility (R26.89)   Precautions/Allergies:   Adhesive tape-silicones and Pcn [penicillins]   TREATMENT PLAN:  Effective Dates: 10/5/2021 TO 2022 (90 days). Frequency/Duration: 2 times a week for 90 Day(s)    Pre-treatment Symptoms/Complaints:  2021: Patient reports she is doing well today, but she has some swollen lymph nodes and the doctor doesn't know what is causing them. Pain: Initial: Pain Intensity 1: 0  Post Session:  0/10   Medications Last Reviewed:  2021  Updated Objective Findings:  None Today  TREATMENT:     NEUROMUSCULAR RE-EDUCATION: (40 minutes):  Exercise/activities per grid below to improve balance, coordination, kinesthetic sense, posture and proprioception. Required minimal visual and verbal cues to promote static and dynamic balance in standing. Balance/Vestibular Treatment:   Activity   Date  2021   Activity/Exercise   Sets/reps/equipment   Walking with head turns     4 laps    Walking with head up & down     4 laps   Step ups        Step taps     6 inch   10 reps  crosstaps   10 reps   Marching   4 laps   Sidestepping   4 laps   Crossovers      [place]      Walking  backwards        Tandem walking   4 laps   Weaving in/out of cones     4 laps    Picking up cones        Stepping over half foam   10 reps forward  B LE  10 reps lateral B LE   Ball toss      Kick ball      Figure 8s       Circles right/left      Walking with 360 degree turns      Spirals      Weight shifting:    Left & Right     Tiltboard   Weight shifting:   Forward & Backward      Tiltboard   Static Standing Balance     Tiltboard eyes open and eyes closed   Standing with feet apart        Standing with feet together     Blue foams  5 seconds eyes closed  3 reps   Standing with feet semitandem   Blue foams  5 seconds eyes closed  3 reps each direction   Standing with feet tandem      Single leg stance      VOR Standing head turns  5 reps  2 sets    Seated head nods  5 reps  2 sets  HEP       Treatment/Session Summary:    · Response to Treatment:  Patient tolerated treatment but needed several sitting rest breaks today due to dizziness. · Communication/Consultation:  None today  · Equipment provided today:  None today  · Recommendations/Intent for next treatment session: Next visit will focus on improving overall mobility.     Total Treatment Billable Duration:  45 minutes   PT Patient Time In/Time Out  Time In: 1645  Time Out: [address], PT    Visit # Therapist initials Date Arrived NS/ Cx < 24 hr >24 hr Cx Visit Comments   11 JS 8/18/2021 X   No visit limit   12 JS 8/24/2021 X      13 JS 8/26/2021 X      14 JS 8/31/2021 X       JS 9/2/2021  X   Mom fell; at MDs office   15 JS 9/8/2021 X       16 PV 9/13/2021 X      17 JS 9/21/2021 X    Progress Note   18 JS 9/24/2021 X       19 JS 9/28/2021 X        JS 10/1/2021   CX Covid   20 JS 47/22/4862  X   Recert   21 PV 19/8/7481 X      22 JS 11/3/2021 X       23 PV 11/12/2021 X      24 PV 11/16/2021 X      25 PV 11/19/2021 X      26 JS 11/23/2021 X       27 JS 11/30/2021 X        PV 12/6/2021  CX     28 JS 12/9/2021 X    Progress Note                                Abbreviations: NS = No Show; CX = cancelled     Future Appointments   Date Time Provider Gladis Jackson   12/9/2021  4:45 PM Luis Scott PT [facility] SFE   12/14/2021  3:15 PM SONAL Nava SFE   12/15/2021 10:00 AM Lou Matthews MD BSNE BSNE   12/21/2021  4:45 PM SONAL Nava SFE   12/23/2021  4:45 PM Luis Scott PT [facility]   12/28/2021  3:15 PM Aashish Nogueira, [place] ROSEMARYEORPJORGE SIMEON   12/30/2021  3:15 PM Giulia Dumas Mom, PT ROSEMARYETONY RILEYE

## 2021-12-14 ENCOUNTER — HOSPITAL ENCOUNTER (OUTPATIENT)
Dept: PHYSICAL THERAPY | Age: 61
Discharge: HOME | End: 2021-12-14
Attending: PSYCHIATRY & NEUROLOGY
Payer: COMMERCIAL

## 2021-12-14 PROCEDURE — 97112 NEUROMUSCULAR REEDUCATION: CPT

## 2021-12-14 NOTE — PROGRESS NOTES
Ramon Krishnamurthy Zenaida  : 1960  Primary: Patricio Medina [facility]  Secondary:  [address] at [address], [address] [place] [address],[address], [address] [facility]  [phone]   [phone]        OUTPATIENT PHYSICAL THERAPY: Daily Treatment Note 2021  Visit Count:  28    ICD-10: Treatment Diagnosis:     Dizziness and giddiness (R42)   Other abnormalities of gait and mobility (R26.89)   Precautions/Allergies:   Adhesive tape-silicones and Pcn [penicillins]   TREATMENT PLAN:  Effective Dates: 10/5/2021 TO 2022 (90 days). Frequency/Duration: 2 times a week for 90 Day(s)    Pre-treatment Symptoms/Complaints:  2021: Patient reports she is a little swimmy headed right now. Pain: Initial: Pain Intensity 1: 0  Post Session:  0/10   Medications Last Reviewed:  2021  Updated Objective Findings:  None Today  TREATMENT:     NEUROMUSCULAR RE-EDUCATION: (40 minutes):  Exercise/activities per grid below to improve balance, coordination, kinesthetic sense, posture and proprioception. Required minimal visual and verbal cues to promote static and dynamic balance in standing. Balance/Vestibular Treatment:   Activity   Date  2021   Activity/Exercise   Sets/reps/equipment   Walking with head turns     4 laps    Walking with head up & down     4 laps   Step ups        Step taps     6 inch   10 reps  crosstaps   10 reps   Marching   4 laps   Sidestepping   4 laps   Crossovers      [place]      Walking  backwards        Tandem walking   4 laps   Weaving in/out of cones     4 laps    Picking up cones        Stepping over half foam   10 reps forward  B LE  10 reps lateral B LE   Ball toss      Kick ball      Figure 8s       Circles right/left      Walking with 360 degree turns      Spirals      Weight shifting:    Left & Right     Tiltboard   Weight shifting:   Forward & Backward      Tiltboard   Static Standing Balance     Tiltboard eyes open and eyes closed   Standing with feet apart        Standing with feet together     Blue foams  5 seconds eyes closed  3 reps   Standing with feet semitandem   Blue foams  5 seconds eyes closed  3 reps each direction   Standing with feet tandem      Single leg stance      VOR Standing head turns  5 reps  2 sets    Seated head nods  5 reps  2 sets  HEP       Treatment/Session Summary:    · Response to Treatment:  Patient tolerated treatment but needed several sitting rest breaks today due to dizziness. · Communication/Consultation:  None today  · Equipment provided today:  None today  · Recommendations/Intent for next treatment session: Next visit will focus on improving overall mobility.     Total Treatment Billable Duration:  40 minutes   PT Patient Time In/Time Out  Time In: 1603  Time Out: [address], PT    Visit # Therapist initials Date Arrived NS/ Cx < 24 hr >24 hr Cx Visit Comments   11  8/18/2021 X   No visit limit   12 JS 8/24/2021 X      13 JS 8/26/2021 X      14 JS 8/31/2021 X       JS 9/2/2021  X   Mom fell; at MDs office   15 JS 9/8/2021 X       16 PV 9/13/2021 X      17 JS 9/21/2021 X    Progress Note   18 JS 9/24/2021 X       19 JS 9/28/2021 X        JS 10/1/2021   CX Covid   20 JS 67/99/4957  X   Recert   21 PV 29/2/7927 X      22 JS 11/3/2021 X       23 PV 11/12/2021 X      24 PV 11/16/2021 X      25 PV 11/19/2021 X      26 JS 11/23/2021 X       27 JS 11/30/2021 X        PV 12/6/2021  CX     28 JS 12/9/2021 X    Progress Note   29 Js 12/14/2021 X                           Abbreviations: NS = No Show; CX = cancelled     Future Appointments   Date Time Provider Gladis Miltonisti   12/14/2021  3:15 PM Bret Wesley PT [facility] SFE   12/15/2021 10:00 AM Manuel Matthews MD BSNE BSNE   12/21/2021  4:45 PM Bret Wesley PT SFEORPT SFE   12/23/2021  4:45 PM Bret Wesley PT SFEORPT SFE   12/28/2021  3:15 PM Venkata Dumas PT SFEORPT SFE   12/30/2021  3:15 PM Venkata Dumas PT SFEORPT SFE

## 2021-12-21 ENCOUNTER — HOSPITAL ENCOUNTER (OUTPATIENT)
Dept: PHYSICAL THERAPY | Age: 61
Discharge: HOME | End: 2021-12-21
Attending: PSYCHIATRY & NEUROLOGY
Payer: COMMERCIAL

## 2021-12-21 PROCEDURE — 97112 NEUROMUSCULAR REEDUCATION: CPT

## 2021-12-21 NOTE — PROGRESS NOTES
Lex Garcia Ploof  : 1960  Primary: Vonnie Jin [facility]  Secondary:  [address] at [place] [address] [facility], [address] [place] [address],[address], [address] [facility]  [phone]   [phone]        OUTPATIENT PHYSICAL THERAPY: Daily Treatment Note 2021  Visit Count:  29    ICD-10: Treatment Diagnosis:     Dizziness and giddiness (R42)   Other abnormalities of gait and mobility (R26.89)   Precautions/Allergies:   Adhesive tape-silicones and Pcn [penicillins]   TREATMENT PLAN:  Effective Dates: 10/5/2021 TO 2022 (90 days). Frequency/Duration: 2 times a week for 90 Day(s)    Pre-treatment Symptoms/Complaints:  2021: Patient reports she is already a little dizzy today. Pain: Initial: Pain Intensity 1: 0  Post Session:  0/10   Medications Last Reviewed:  2021  Updated Objective Findings:  None Today  TREATMENT:     NEUROMUSCULAR RE-EDUCATION: (40 minutes):  Exercise/activities per grid below to improve balance, coordination, kinesthetic sense, posture and proprioception. Required minimal visual and verbal cues to promote static and dynamic balance in standing. Balance/Vestibular Treatment:   Activity   Date  2021   Activity/Exercise   Sets/reps/equipment   Walking with head turns     4 laps    Walking with head up & down     4 laps   Step ups        Step taps     6 inch   10 reps  crosstaps   10 reps   Marching   4 laps   Sidestepping   4 laps   Crossovers      [place]      Walking  backwards        Tandem walking   4 laps   Weaving in/out of cones     4 laps    Picking up cones        Stepping over half foam   10 reps forward  B LE  10 reps lateral B LE   Ball toss      Kick ball      Figure 8s       Circles right/left      Walking with 360 degree turns      Spirals      Weight shifting:    Left & Right     Tiltboard   Weight shifting:   Forward & Backward      Tiltboard   Static Standing Balance     Tiltboard eyes open and eyes closed   Standing with feet apart Standing with feet together     Blue foams  5 seconds eyes closed  3 reps   Standing with feet semitandem   Blue foams  5 seconds eyes closed  3 reps each direction   Standing with feet tandem      Single leg stance      VOR Standing head turns  5 reps  2 sets    Seated head nods  5 reps  2 sets  HEP       Treatment/Session Summary:    · Response to Treatment:  Patient tolerated treatment but needed several sitting rest breaks today due to dizziness. · Communication/Consultation:  None today  · Equipment provided today:  None today  · Recommendations/Intent for next treatment session: Next visit will focus on improving overall mobility.     Total Treatment Billable Duration:  40 minutes   PT Patient Time In/Time Out  Time In: 1645  Time Out: [address], PT    Visit # Therapist initials Date Arrived NS/ Cx < 24 hr >24 hr Cx Visit Comments   11 JS 8/18/2021 X   No visit limit   12 JS 8/24/2021 X      13 JS 8/26/2021 X      14 JS 8/31/2021 X       JS 9/2/2021  X   Mom fell; at MDs office   15 JS 9/8/2021 X       16 PV 9/13/2021 X      17 JS 9/21/2021 X    Progress Note   18 JS 9/24/2021 X       19 JS 9/28/2021 X        JS 10/1/2021   CX Covid   20 JS 57/22/9978  X   Recert   21 PV 10/7/6657 X      22 JS 11/3/2021 X       23 PV 11/12/2021 X      24 PV 11/16/2021 X      25 PV 11/19/2021 X      26 JS 11/23/2021 X       27 JS 11/30/2021 X        PV 12/6/2021  CX     28 JS 12/9/2021 X    Progress Note   29 Js 12/14/2021 X       30 JS 12/21/2021 X                  Abbreviations: NS = No Show; CX = cancelled     Future Appointments   Date Time Provider Gladis Jackson   12/21/2021  4:45 PM Tree Dhillon, PT [facility] SFE   12/23/2021  4:45 PM Tree Dhillon, PT SFE[facility]PT SFE   12/28/2021  3:15 PM Renzo Dumas, PT S[facility]PT SFE   12/30/2021  3:15 PM Renzo Dumas, PT SFEORPT SFE

## 2021-12-23 ENCOUNTER — HOSPITAL ENCOUNTER (OUTPATIENT)
Dept: PHYSICAL THERAPY | Age: 61
Discharge: HOME | End: 2021-12-23
Attending: PSYCHIATRY & NEUROLOGY
Payer: COMMERCIAL

## 2021-12-23 NOTE — PROGRESS NOTES
Wendy Arshadoof  : 1960  Primary: Varsha Valencia [facility]  Secondary:  [address] at [place] [address] [facility], [address] [place] [address],[address], [employer] [facility] [address]  [phone]   [phone]          OUTPATIENT DAILY NOTE    NAME/AGE/GENDER: Kunal Houston is a 63 y.o. female. DATE: 2021    Patient cancelled (more than 24 hours ago) her appointment for today due to reasons not given. Will plan to follow up on next scheduled visit.     Baron Olmos, PT    Future Appointments   Date Time Provider Gladis Jackson   2021  7:00 PM Evan Villavicencio [facility] SFE   2021  3:15 PM Amy Ramirez, SONAL [facility] SFE   2021  3:15 PM Jean-Paul Dumas, PT [facility]FE

## 2021-12-28 ENCOUNTER — HOSPITAL ENCOUNTER (OUTPATIENT)
Dept: PHYSICAL THERAPY | Age: 61
Discharge: HOME | End: 2021-12-28
Attending: PSYCHIATRY & NEUROLOGY
Payer: COMMERCIAL

## 2021-12-28 NOTE — PROGRESS NOTES
Ananya Sharp Ploof  : 1960  Primary: Tala Rothman [facility]  Secondary:  [address] at [place] [address] [facility], [address] [place] [address],[address], [facility] [address]  [phone]   [phone]          OUTPATIENT DAILY NOTE    NAME/AGE/GENDER: Perri Du is a 63 y.o. female. DATE: 2021    Patient cancelled (less than 24 hours ago) her appointment for today due to illness. Will plan to follow up on next scheduled visit.     Steph Child, PT    Future Appointments   Date Time Provider Gladis Jackson   2021  3:15 PM [facility]

## 2021-12-30 ENCOUNTER — APPOINTMENT (OUTPATIENT)
Dept: PHYSICAL THERAPY | Age: 61
End: 2021-12-30
Attending: PSYCHIATRY & NEUROLOGY
Payer: COMMERCIAL

## 2022-02-01 ENCOUNTER — HOSPITAL ENCOUNTER (OUTPATIENT)
Dept: CT IMAGING | Age: 62
Discharge: HOME | End: 2022-02-01
Attending: SURGERY
Payer: COMMERCIAL

## 2022-02-01 DIAGNOSIS — R59.0 POSTERIOR CERVICAL ADENOPATHY: ICD-10-CM

## 2022-02-01 LAB — CREAT BLD-MCNC: 0.67 MG/DL (ref 0.8–1.5)

## 2022-02-01 PROCEDURE — 70491 CT SOFT TISSUE NECK W/DYE: CPT

## 2022-02-01 PROCEDURE — 74011000258 HC RX REV CODE- 258: Performed by: SURGERY

## 2022-02-01 PROCEDURE — 74011000636 HC RX REV CODE- 636: Performed by: SURGERY

## 2022-02-01 PROCEDURE — 82565 ASSAY OF CREATININE: CPT

## 2022-02-01 RX ORDER — SODIUM CHLORIDE 0.9 % (FLUSH) 0.9 %
10 SYRINGE (ML) INJECTION
Status: COMPLETED | OUTPATIENT
Start: 2022-02-01 | End: 2022-02-01

## 2022-02-01 RX ADMIN — IOPAMIDOL 100 ML: 755 INJECTION, SOLUTION INTRAVENOUS at 17:28

## 2022-02-01 RX ADMIN — SODIUM CHLORIDE 100 ML: 9 INJECTION, SOLUTION INTRAVENOUS at 17:28

## 2022-02-01 RX ADMIN — Medication 10 ML: at 17:28

## 2022-02-07 PROBLEM — R59.0 CERVICAL ADENOPATHY: Status: ACTIVE | Noted: 2022-02-07

## 2022-03-08 ENCOUNTER — HOSPITAL ENCOUNTER (OUTPATIENT)
Dept: LAB | Age: 62
Discharge: HOME | End: 2022-03-08

## 2022-03-08 PROCEDURE — 88305 TISSUE EXAM BY PATHOLOGIST: CPT

## 2022-03-18 PROBLEM — R52 BODY ACHES: Status: ACTIVE | Noted: 2017-03-26

## 2022-03-18 PROBLEM — G25.81 RLS (RESTLESS LEGS SYNDROME): Status: ACTIVE | Noted: 2018-05-24

## 2022-03-19 PROBLEM — F33.9 RECURRENT DEPRESSION (HCC): Status: ACTIVE | Noted: 2018-03-20

## 2022-03-19 PROBLEM — G24.3 CERVICAL DYSTONIA: Status: ACTIVE | Noted: 2018-05-24

## 2022-03-19 PROBLEM — G47.52 RBD (REM BEHAVIORAL DISORDER): Status: ACTIVE | Noted: 2018-05-24

## 2022-03-19 PROBLEM — R59.0 CERVICAL ADENOPATHY: Status: ACTIVE | Noted: 2022-02-07

## 2022-03-19 PROBLEM — N61.0 MASTITIS IN FEMALE: Status: ACTIVE | Noted: 2018-08-20

## 2022-03-19 PROBLEM — R25.1 TREMOR: Status: ACTIVE | Noted: 2018-05-24

## 2022-03-20 PROBLEM — G25.69 TICS OF ORGANIC ORIGIN: Status: ACTIVE | Noted: 2020-04-08

## 2022-04-05 NOTE — THERAPY DISCHARGE
Randa Estevez  : 1960  Primary: Juan Austin [facility]  Secondary:  [address] at [place] [address] [facility], [address] [place] [address],[address], [place] [address].  [phone]   [phone]          OUTPATIENT PHYSICAL THERAPY:Discontinuation Summary    ICD-10: Treatment Diagnosis:     Dizziness and giddiness (R42)   Other abnormalities of gait and mobility (R26.89)   Precautions/Allergies:   Adhesive tape-silicones and Pcn [penicillins]   TREATMENT PLAN:  Effective Dates: 10/5/2021 TO 2022  (90 days). Frequency/Duration: 2 times a week for 90 Day(s) MEDICAL/REFERRING DIAGNOSIS:  Other abnormalities of gait and mobility [R26.89]   DATE OF ONSET: Chronic  REFERRING PHYSICIAN: Juan José Matthews MD Orders: Evaluate and Treat  Return MD Appointment: TBD     ASSESSMENT:  Ms. Carmela Pool presented with improving mobility, strength, and safety with ambulation since initial evaluation. Patient attended a total of 29 scheduled physical therapy visits including initial evaluation on 2021. Treatment consisted of mobility and balance activities to improve overall safety and performance with activities of daily living. Patient was making steady progress with all aspects of therapy at this time. Patient did not attend any additional physical therapy visits secondary to unknown reasons. Patient's therapy will be discontinued at this time. We will be happy to re-assess her with a change in her status and a new order from her doctor. Thank you for the opportunity to serve this patient. GOALS: (Goals have been discussed and agreed upon with patient.)  Short-Term Functional Goals: Time Frame: 30 days  1. Patient will be independent with home exercise program without exacerbation of symptoms or cueing needed--goal met. Discharge Goals: Time Frame: 90 days  1.  Patient will be independent with all ADLs with minimal fear of falling and loss of balance with daily tasks--goal unmet(stairs). 2. Patient will report no fear avoidance with social or recreational activities due to fear of falling--goal unmet. 3. Patient will score greater than or equal to 50/56 on Bashir Balance Scale with minimal effect of imbalance on patient's ability to manage every day life activities--goal unmet. 4. Patient will score greater than or equal to 25/30 on Functional Gait Assessment with minimal effect of imbalance on patient's ability to manage every day life activities--goal unmet. OUTCOME MEASURE:   Tool Used: Bashir Balance Scale  Score:  Initial: 45/56 Most Recent: 46/56 (Date: 12/9/2021 )   Interpretation of Score: Each section is scored on a 0-4 scale, 0 representing the patients inability to perform the task and 4 representing independence. The scores of each section are added together for a total score of 56. The higher the patients score, the more independent the patient is. Any score below 45 indicates increased risk for falls. Tool Used: Functional Gait Assessment  Score:  Initial: 18/30  Most Recent: 20/30 (Date: 12/9/2021 )   Interpretation of Score: This test is a modification of the Dynamic Gait Index. It is a 10 item test with each item score 0-3 that assesses postural stability during various walking tasks.       EXAMINATION:   Observation/Orthostatic Postural Assessment:          Posture Assessment: Rounded shoulders, Forward head   Functional Mobility:   Gait/Mobility:    · Base of Support: Center of gravity altered  · Speed/Jammie: Pace decreased (<100 feet/min)  · Step Length: Left shortened, Right shortened  · Swing Pattern: Left symmetrical, Right symmetrical  · Gait Abnormalities: Antalgic (mild)  · Ambulation - Level of Assistance: Independent  · Interventions: Verbal cues, Safety awareness training          Transfers:     · Sit to Stand: Modified independent  · Stand to Sit: Independent  · Stand Pivot Transfers: Modified independent  · Bed to Chair: Modified independent  · Lateral Transfers: Modified independent         Bed Mobility:     · Rolling: Independent  · Supine to Sit: Independent  · Sit to Supine: Independent  · Scooting: Independent       Strength:          UE STRENGTH: 4/5 shoulder flexion, 4/5 shoulder abduction, 4/5 shoulder extension, 4/5 elbow flexion, 4/5 elbow extension. LE STRENGTH:  4-/5 hip flexion, 4-/5 hip abduction, 4-/5 hip adduction, 4-/5 hip extension, 4-/5 knee extension, 4-/5 knee flexion, 2+/5 ankle dorsiflexion, 2+/5 ankle plantarflexion, 2+/5 ankle inversion, 2+/5 ankle eversion.   Sensation:         Intact

## 2022-09-28 RX ORDER — CITALOPRAM 40 MG/1
TABLET ORAL
Qty: 90 TABLET | Refills: 3 | Status: SHIPPED | OUTPATIENT
Start: 2022-09-28

## 2022-10-21 ENCOUNTER — OFFICE VISIT (OUTPATIENT)
Dept: NEUROLOGY | Age: 62
End: 2022-10-21
Payer: MEDICARE

## 2022-10-21 VITALS
SYSTOLIC BLOOD PRESSURE: 145 MMHG | WEIGHT: 233 LBS | HEART RATE: 64 BPM | BODY MASS INDEX: 41.29 KG/M2 | HEIGHT: 63 IN | DIASTOLIC BLOOD PRESSURE: 99 MMHG

## 2022-10-21 DIAGNOSIS — G24.3 CERVICAL DYSTONIA: Primary | ICD-10-CM

## 2022-10-21 DIAGNOSIS — R29.2 BABINSKI SIGN POSITIVE IN LEFT FOOT: ICD-10-CM

## 2022-10-21 DIAGNOSIS — R25.1 TREMOR: ICD-10-CM

## 2022-10-21 DIAGNOSIS — G25.69 TICS OF ORGANIC ORIGIN: ICD-10-CM

## 2022-10-21 PROCEDURE — G8427 DOCREV CUR MEDS BY ELIG CLIN: HCPCS | Performed by: PSYCHIATRY & NEUROLOGY

## 2022-10-21 PROCEDURE — G8484 FLU IMMUNIZE NO ADMIN: HCPCS | Performed by: PSYCHIATRY & NEUROLOGY

## 2022-10-21 PROCEDURE — 3017F COLORECTAL CA SCREEN DOC REV: CPT | Performed by: PSYCHIATRY & NEUROLOGY

## 2022-10-21 PROCEDURE — 1036F TOBACCO NON-USER: CPT | Performed by: PSYCHIATRY & NEUROLOGY

## 2022-10-21 PROCEDURE — G8417 CALC BMI ABV UP PARAM F/U: HCPCS | Performed by: PSYCHIATRY & NEUROLOGY

## 2022-10-21 PROCEDURE — 99215 OFFICE O/P EST HI 40 MIN: CPT | Performed by: PSYCHIATRY & NEUROLOGY

## 2022-10-21 ASSESSMENT — ENCOUNTER SYMPTOMS
GASTROINTESTINAL NEGATIVE: 1
EYES NEGATIVE: 1
ALLERGIC/IMMUNOLOGIC NEGATIVE: 1
RESPIRATORY NEGATIVE: 1

## 2022-10-21 NOTE — PROGRESS NOTES
10/21/22  Kvng Carmona        Chief Complaint:  Chief Complaint   Patient presents with    Follow-up    Neurologic Problem           HPI:   Shanna Carmona, 63 y.o.,female here in clinic follow up for continued management of cervical dystonia. Has not been seen in a little over a year. Previous history:  head pulling to the right began. Started with stress at work. . She has since had worsening neck pain and jerking movements and she can't tolerate it any longer. The movements became more complex involving the arm almost right away. She denies OCD behaviors, mild anxiety. Had more as a child. Denies having tics as a child. She states that as a baby she had a \"deficiency of phosphate\" and treated and she was fine but it was rare then and they thought she was having seizures. This history she is unclear about. She first noticed a dystonia pulling her head to the right, dragging her leg, tremor on right side. She did not notice any left sided symptoms until June 2021 when she had some increase in weakness and I found her to have upgoing toes on the left. We ordered a brain MRI with and without contrast and that was negative for acute or subacute changes. Since that time she had a round of botox and things were improving. She has tried artane in the past to help with dystonia but it was stopped once she started botox and she was never sure how much it helped for sure. She is now back and has the same symptoms as before. She states that her right arm extending and pulling up across her chest, happens at times. Days without this problem. But she can't control it at all. She denies history of tics and this is not suppressible but she does have OCD and family history of the same. Current Neuro related meds:        Review of Systems:    Review of Systems   Constitutional: Negative. HENT:  Positive for tinnitus. Eyes: Negative. Respiratory: Negative. Cardiovascular: Negative.     Gastrointestinal: Negative. Endocrine: Negative. Genitourinary: Negative. Musculoskeletal:  Positive for neck pain. Skin: Negative. Allergic/Immunologic: Negative. Neurological:  Positive for dizziness, numbness and headaches. Hematological: Negative. Psychiatric/Behavioral: Negative. Patient denies:  dizziness or light headedness,  drooling or swallowing issues  constipation,   hallucinations/ visual illusions or impulse control disorder   recent falls.    RBD     RLS    Past Medical History:   Diagnosis Date    Chronic obstructive pulmonary disease ([facility]     daily inhaler    Dystonia     H/O gastric bypass 12/9/2013 at [facility]    wt loss 111 lbs and has keep off    Hx of diabetes mellitus     since gastric bypass-- not a problem--- off meds x 6 months    Liver cirrhosis secondary to SEVERINO ([facility]     per pt-- gone now since wt loss-- levels normal per pt    Obese     bmi =41    Parkinson's disease ([facility] dx--2010    mild--- sees dr. ahmadi---no meds    RBD (REM behavioral disorder) 5/24/2018    RLS (restless legs syndrome)     Thromboembolus ([facility] 2008    right leg. --- unknown cause    Unspecified adverse effect of anesthesia 3/2014 at [facility]    states remembers waking up with tube in her throat with lipoma removal    Unspecified sleep apnea     does not wear cpap        Past Surgical History:   Procedure Laterality Date    CHOLECYSTECTOMY, LAPAROSCOPIC  3/2013    GASTRIC BYPASS SURGERY  12/2013 at [address] [place] [address]  6/2013    OTHER SURGICAL HISTORY  3/2014 at [facility]    lipoma removed from shoulder       Family History   Problem Relation Age of Onset    Post-op Nausea/Vomiting Mother     Cancer Mother     Breast Cancer Mother 68    Heart Disease Father        Social History     Socioeconomic History    Marital status:      Spouse name: Not on file    Number of children: Not on file    Years of education: Not on file    Highest education level: Not on file   Occupational History    Not on file Tobacco Use    Smoking status: Former     Packs/day: 1.00     Types: Cigarettes     Quit date: 3/27/2006     Years since quittin.5    Smokeless tobacco: Never   Substance and Sexual Activity    Alcohol use: Yes    Drug use: No    Sexual activity: Not on file   Other Topics Concern    Not on file   Social History Narrative    Not on file     Social Determinants of Health     Financial Resource Strain: Not on file   Food Insecurity: Not on file   Transportation Needs: Not on file   Physical Activity: Not on file   Stress: Not on file   Social Connections: Not on file   Intimate Partner Violence: Not on file   Housing Stability: Not on file       Current Outpatient Medications on File Prior to Visit   Medication Sig Dispense Refill    citalopram (CELEXA) 40 MG tablet TAKE 1 TABLET BY MOUTH DAILY 90 tablet 3    albuterol sulfate  (90 Base) MCG/ACT inhaler Inhale 1-2 puffs into the lungs every 6 hours as needed      nystatin (MYCOSTATIN) 454273 UNIT/GM powder Apply twice daily      onabotulinumtoxin A (BOTOX) 100 units injection Inject into the muscle      ondansetron (ZOFRAN-ODT) 4 MG disintegrating tablet Take 4 mg by mouth every 8 hours as needed      pantoprazole (PROTONIX) 40 MG tablet Take 40 mg by mouth daily       No current facility-administered medications on file prior to visit. Allergies   Allergen Reactions    Penicillins Swelling     Joints swelling            Physical Examination    Vitals:    10/21/22 1047   BP: (!) 145/99   Pulse: 64   Weight: 233 lb (105.7 kg)   Height: 5' 3\" (1.6 m)         General: No acute distress  Psychiatric: well oriented, normal mood and affect  Cardiovascular: no peripheral edema, no JVD, no carotid bruits, RRR  Pulmonary: CTAB  Skin: No rashes, lesions or ulcers  Ext: no C/C/E      Neurologic Exam  Patient oriented to Person, Place and Time. Language and fund of knowledge intact.    CN:  Extraocular movements are intact,facial sensation  Intact and strength is intact, , palate elevates normally, tongue protrudes midline, shoulder shrug is normal.    Motor:RUE 5/5, RLE 5/5, LUE 5/5, LLE 5/5 ,  normal bulk and tone    Reflexes: Patellar reflexes are +2 , Achilles reflexes are 1+ , toes are still upgoing on the left. Sensation: Normal  light touch, temperature and vibration throughout     Cerebellar: FTN, HTS intact    Motor Examination:           Voice tremor       Head/neck            Head suddenly pulls to the right and right arm pulls out in extension and index finger extends and dystonic overflow tremor noted throughout , no no head tremor     RIGHT LEFT   Tremor at rest 2 0   Postural Tremor of hands 0 0   Action Tremor of hands 0 0   Tremor of Lower extremity 0 0   Open/Close 0 0   Rapid Alternating Movements of Hands 0 0   Finger Taps 1 0   Rigidity - Upper extremity 0 0   Rigidity- Lower extremity  0 0   Foot tapping/LE agility 1 1         Hypophonia 0   Hypomimia 0   Arising from Chair WNL   Posture Head pulls down and right. Slightly stooped   Gait Slow with loss of balance when her head pulls and is looking down so hard to see where she is going   Pull test Deferred due to imbalance   Dyskinesia  absent   Body Bradykinsesia 0        Assessment/Plan:   Diagnosis Orders   1. Cervical dystonia        2. Tremor        3. Tics of organic origin        4. Babinski sign positive in left foot            This was from her first visit and exam and still holds true today:  On exam today I see the head jerking that is persistent in pattern and frequent and she states on further questioning that she has a constant sensation of her head pulling to the right. This has been the issue since her problems started in 2009. However the more complex movement noted with head turning and then arm and index finger extending out, this started later. She states that this movement she can feel coming on and it is worse with anxiety, stress or strong emotions.  She can also suppress this complex movement but not the head pulling. However if she suppresses the movement she feels more anxiety and eventually a need to make the movement. Once she does allow the movement to occur she does feel a sense of relief. She describes it as a \"fountain building till it finally goes off\". I do not see signs of PD and I am fine with her being off neupro, it never helped her and neither did other related PD medications. I suspect that she does indeed have cervical dystonia but the the dystonia has lead to some complex tics. The arm and hand movement/tics are triggered by typical tic triggers but the dystonia also makes it worse. Plan to inject botox as before. And can consider treatment for Tics if not better for arm movement. Upgoing toes is unclear of eitiology. There is a delay when it happens. Will review records. Injection Schedule:       Left SCM                     20 units at 1 site, EMG   Left upper trap             20 units at 1 site, EMG       Right Spleni                 50 units at 1 site, EMG   Right levator                20 units at 1 site,EMG   Right upper trap          20 units at 1 site, EMG    Right middle trap         20 units at 1 site, EMG    Right Occipitalis          10 units at 1 site, EMG   Total 160    I have spent greater than 50% of the patient's 60 minute visit  in counseling for importance of exercise,  medications and education of disease and disease progression. Patient is to continue all other medications as directed by prescribing physicians unless addressed above in plan. Continuation of these medications from today's visit are made based on the patient's report of current medications.      José Luis Young MD  [facility] Neurology  Director Movement Disorders Program  [facility]. [address]  [place], [address]  Phone: [phone]  Fax: [phone]

## 2022-10-26 ENCOUNTER — OFFICE VISIT (OUTPATIENT)
Dept: NEUROLOGY | Age: 62
End: 2022-10-26
Payer: COMMERCIAL

## 2022-10-26 VITALS — DIASTOLIC BLOOD PRESSURE: 92 MMHG | SYSTOLIC BLOOD PRESSURE: 144 MMHG | HEART RATE: 64 BPM

## 2022-10-26 DIAGNOSIS — G24.3 CERVICAL DYSTONIA: Primary | ICD-10-CM

## 2022-10-26 PROCEDURE — 64616 CHEMODENERV MUSC NECK DYSTON: CPT | Performed by: PSYCHIATRY & NEUROLOGY

## 2022-10-26 PROCEDURE — 95874 GUIDE NERV DESTR NEEDLE EMG: CPT | Performed by: PSYCHIATRY & NEUROLOGY

## 2022-10-26 NOTE — PROGRESS NOTES
Procedure note: Botulinum Toxin injections    Indication: cervical dystonia  First round after over a year break. Informed consent was obtained after the potential risks and benefits have been explained to the patient. Potential risks include:  Pain, bruising, bleeding, infection, flu-like symptoms, over-weakening of injected or adjacent muscles and swallowing dysfunction. Patient was given the botulinum toxin medication guide according to FDA standards. Procedure:  Using a 27 gauge 1.5 inch hollow lumen recording needle on a tuberculin syringe was used to inject bolutinum toxin in the muscles noted below, The following muscles were sampled utilizing EMG and injected as noted:         Injection Schedule:                                     Left SCM                     20 units at 1 site, EMG 2+++   Left upper trap             20 units at 1 site, EMG 2+++        Right Spleni                 50 units at 1 site, EMG 2+++    Right levator                20 units at 1 site,EMG 2+++    Right upper trap          20 units at 1 site, EMG 2+++     Right middle trap         20 units at 1 site, EMG 2+++     Right Occipitalis          10 units at 1 site, EMG 2+++          There were no complications. The patient tolerated the procedure well. Dilution: 1:1  Total number of units of botulinum toxin used:  160 and 40 units were wasted.

## 2022-10-26 NOTE — LETTER
Informed Consent for Botulinumtoxina  (Botox)                                                                         COQ#[id number]    Patient Name: Chasidy Herman             :_1960      I give consent for the following procedure(s)    Botulinumtoxina (Botox) injections to be performed by:    []   Dr. Ana Vasquez    [x]   Dr. Jesika Wyatt    []   Dr. Santa Roblero    I understand that my provider will have the main responsibility for my care specific to procedure. I understand the the doctor may be fellows, residents or other qualified first assistant's may be involved in my procedure under the supervision of the above provider. My provider has explained to me my condition, the nature and purpose of each procedure, and the risks and types of complications involved, the potential benefits and drawbacks, potential problems related to recovery, the likelihood of success, the possible results of non-treatment,and the reasonable options or alternatives. I have had the chance to ask questions about the planned procedure and all my questions have been answered to my satisfaction. I have received no promises from anyone about the results that may be obtained from the procedure. All procedures involve risk. Routine risks include, but are not limited to, perforation and/or injury to nearby blood vessels, nerve and/or organs, infection, blood clots. Other risks may include paralysis and/or _____________________________________. I understand that the information I have received about the risks may not be fully completed and other less common risks.       ________________________________________Date:_______________  Patient Signature    ________________________________________Date:_______________  Provider Signature                         _______________________________________ Date:_______________             Witness Signature [General Appearance - Alert] : alert [General Appearance - In No Acute Distress] : in no acute distress [General Appearance - Well Nourished] : well nourished [General Appearance - Well Developed] : well developed [General Appearance - Well-Appearing] : healthy appearing [Sclera] : the sclera and conjunctiva were normal [Outer Ear] : the ears and nose were normal in appearance [Neck Appearance] : the appearance of the neck was normal [] : no respiratory distress [Respiration, Rhythm And Depth] : normal respiratory rhythm and effort [Exaggerated Use Of Accessory Muscles For Inspiration] : no accessory muscle use [Auscultation Breath Sounds / Voice Sounds] : lungs were clear to auscultation bilaterally [Chest Palpation] : palpation of the chest revealed no abnormalities [Lungs Percussion] : the lungs were normal to percussion [Heart Rate And Rhythm] : heart rate was normal and rhythm regular [Heart Sounds] : normal S1 and S2 [Heart Sounds Gallop] : no gallops [Murmurs] : no murmurs [Heart Sounds Pericardial Friction Rub] : no pericardial rub [Pitting Edema] : pitting edema present [Bowel Sounds] : normal bowel sounds [Abdomen Soft] : soft [Abdomen Tenderness] : non-tender [No CVA Tenderness] : no ~M costovertebral angle tenderness [No Spinal Tenderness] : no spinal tenderness [Abnormal Walk] : normal gait [No Focal Deficits] : no focal deficits [Oriented To Time, Place, And Person] : oriented to person, place, and time [Impaired Insight] : insight and judgment were intact [FreeTextEntry1] : bilateral lower ext trace up to knee

## 2022-12-29 ENCOUNTER — TELEPHONE (OUTPATIENT)
Dept: NEUROLOGY | Age: 62
End: 2022-12-29

## 2022-12-29 DIAGNOSIS — G24.3 CERVICAL DYSTONIA: Primary | ICD-10-CM
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
## 2023-01-18 ENCOUNTER — OFFICE VISIT (OUTPATIENT)
Dept: NEUROLOGY | Age: 63
End: 2023-01-18
Payer: MEDICARE

## 2023-01-18 VITALS
BODY MASS INDEX: 41.29 KG/M2 | SYSTOLIC BLOOD PRESSURE: 142 MMHG | DIASTOLIC BLOOD PRESSURE: 90 MMHG | WEIGHT: 233 LBS | HEIGHT: 63 IN

## 2023-01-18 DIAGNOSIS — G24.3 CERVICAL DYSTONIA: Primary | ICD-10-CM

## 2023-01-18 DIAGNOSIS — S06.0X1A CONCUSSION WITH LOSS OF CONSCIOUSNESS OF 30 MINUTES OR LESS, INITIAL ENCOUNTER: ICD-10-CM

## 2023-01-18 PROCEDURE — 99213 OFFICE O/P EST LOW 20 MIN: CPT | Performed by: PSYCHIATRY & NEUROLOGY

## 2023-01-18 PROCEDURE — 95874 GUIDE NERV DESTR NEEDLE EMG: CPT | Performed by: PSYCHIATRY & NEUROLOGY

## 2023-01-18 PROCEDURE — G8417 CALC BMI ABV UP PARAM F/U: HCPCS | Performed by: PSYCHIATRY & NEUROLOGY

## 2023-01-18 PROCEDURE — 3017F COLORECTAL CA SCREEN DOC REV: CPT | Performed by: PSYCHIATRY & NEUROLOGY

## 2023-01-18 PROCEDURE — G8484 FLU IMMUNIZE NO ADMIN: HCPCS | Performed by: PSYCHIATRY & NEUROLOGY

## 2023-01-18 PROCEDURE — 64616 CHEMODENERV MUSC NECK DYSTON: CPT | Performed by: PSYCHIATRY & NEUROLOGY

## 2023-01-18 PROCEDURE — G8427 DOCREV CUR MEDS BY ELIG CLIN: HCPCS | Performed by: PSYCHIATRY & NEUROLOGY

## 2023-01-18 PROCEDURE — 1036F TOBACCO NON-USER: CPT | Performed by: PSYCHIATRY & NEUROLOGY

## 2023-01-18 NOTE — LETTER
Informed Consent for Botulinumtoxina  (Botox)                                                                         [facility]#[id number]    Patient Name: Chris Mack             :_1960      I give consent for the following procedure(s)    Botulinumtoxina (Botox) injections to be performed by:    []   Dr. Gume Myers    [x]   Dr. Andre Sam    []   Dr. Derina Soni    I understand that my provider will have the main responsibility for my care specific to procedure. I understand the the doctor may be fellows, residents or other qualified first assistant's may be involved in my procedure under the supervision of the above provider. My provider has explained to me my condition, the nature and purpose of each procedure, and the risks and types of complications involved, the potential benefits and drawbacks, potential problems related to recovery, the likelihood of success, the possible results of non-treatment,and the reasonable options or alternatives. I have had the chance to ask questions about the planned procedure and all my questions have been answered to my satisfaction. I have received no promises from anyone about the results that may be obtained from the procedure. All procedures involve risk. Routine risks include, but are not limited to, perforation and/or injury to nearby blood vessels, nerve and/or organs, infection, blood clots. Other risks may include paralysis and/or _____________________________________. I understand that the information I have received about the risks may not be fully completed and other less common risks.       ________________________________________Date:_______________  Patient Signature    ________________________________________Date:_______________  Provider Signature                         _______________________________________ Date:_______________             Witness Signature

## 2023-08-02 ENCOUNTER — OFFICE VISIT (OUTPATIENT)
Dept: NEUROLOGY | Age: 63
End: 2023-08-02

## 2023-08-02 VITALS — OXYGEN SATURATION: 93 % | SYSTOLIC BLOOD PRESSURE: 139 MMHG | HEART RATE: 64 BPM | DIASTOLIC BLOOD PRESSURE: 83 MMHG

## 2023-08-02 DIAGNOSIS — G24.3 CERVICAL DYSTONIA: Primary | ICD-10-CM

## 2023-10-20 NOTE — PROGRESS NOTES
Procedure note: Botulinum Toxin injections     Indication: cervical dystonia  The patient has continued to have a good response to botulinum therapy with improved ROM, pain, and head position. Denies side effects. Informed consent was obtained after the potential risks and benefits have been explained to the patient. Potential risks include:  Pain, bruising, bleeding, infection, flu-like symptoms, over-weakening of injected or adjacent muscles and swallowing dysfunction. Patient was given the botulinum toxin medication guide according to FDA standards. Procedure:  Using a 27 gauge 1.5 inch hollow lumen recording needle on a tuberculin syringe was used to inject bolutinum toxin in the muscles noted below, The following muscles were sampled utilizing EMG and injected as noted:          Injection Schedule:                                     Left SCM                     20 units at 1 site, EMG 2+++   Left upper trap             20 units at 1 site, EMG 2+++        Right Spleni                 50 units at 1 site, EMG 2+++    Right levator                20 units at 1 site,EMG 2+++    Right upper trap          20 units at 1 site, EMG 2+++     Right middle trap         20 units at 1 site, EMG 2+++     Right Occipitalis          10 units at 1 site, EMG 2+++             There were no complications. The patient tolerated the procedure well. Dilution: 1:1  Total number of units of botulinum toxin used:  160 and 40 units were wasted.

## 2023-10-25 ENCOUNTER — OFFICE VISIT (OUTPATIENT)
Dept: NEUROLOGY | Age: 63
End: 2023-10-25
Payer: MEDICARE

## 2023-10-25 VITALS — DIASTOLIC BLOOD PRESSURE: 92 MMHG | HEART RATE: 71 BPM | OXYGEN SATURATION: 93 % | SYSTOLIC BLOOD PRESSURE: 161 MMHG

## 2023-10-25 DIAGNOSIS — G24.3 CERVICAL DYSTONIA: Primary | ICD-10-CM

## 2023-10-25 PROCEDURE — 95874 GUIDE NERV DESTR NEEDLE EMG: CPT | Performed by: PSYCHIATRY & NEUROLOGY

## 2023-10-25 PROCEDURE — 64616 CHEMODENERV MUSC NECK DYSTON: CPT | Performed by: PSYCHIATRY & NEUROLOGY

## 2024-01-17 ENCOUNTER — OFFICE VISIT (OUTPATIENT)
Dept: NEUROLOGY | Age: 64
End: 2024-01-17

## 2024-01-17 VITALS — HEART RATE: 70 BPM | OXYGEN SATURATION: 94 % | DIASTOLIC BLOOD PRESSURE: 93 MMHG | SYSTOLIC BLOOD PRESSURE: 159 MMHG

## 2024-01-17 DIAGNOSIS — G24.3 CERVICAL DYSTONIA: Primary | ICD-10-CM

## 2024-05-08 ENCOUNTER — OFFICE VISIT (OUTPATIENT)
Dept: NEUROLOGY | Age: 64
End: 2024-05-08

## 2024-05-08 VITALS — OXYGEN SATURATION: 95 % | HEART RATE: 74 BPM | SYSTOLIC BLOOD PRESSURE: 140 MMHG | DIASTOLIC BLOOD PRESSURE: 82 MMHG

## 2024-05-08 DIAGNOSIS — G24.3 CERVICAL DYSTONIA: Primary | ICD-10-CM

## 2024-08-02 NOTE — PROGRESS NOTES
Procedure note:  Botulinum Toxin injections     Indication: cervical dystonia  The patient has continued to have a good response to botulinum therapy with improved ROM, pain, and head position. Denies side effects.  I adjusted her injection pattern and dose today.      New complaint:  Today she has c/o daily headache since last Sunday. She describes a tight sensation around her head and down into her shoulders. She denies nausea/vomiting/phonophobia/photophobia. She has tenderness to the same areas.   I am diagnosing her with tension headache and daily headache. I will start elavil 10mg Qhs for 2 weeks and also send in a steroid taper to help abort the daily headache. Will call to check in after the first week.         Informed consent was obtained after the potential risks and benefits have been explained to the patient.  Potential risks include:  Pain, bruising, bleeding, infection, flu-like symptoms, over-weakening of injected or adjacent muscles and swallowing dysfunction. Patient was given the botulinum toxin medication guide according to FDA standards.         Procedure:  Using a 27 gauge 1.5 inch hollow lumen recording needle on a tuberculin syringe was used to inject bolutinum toxin in the muscles noted below, The following muscles were sampled utilizing EMG and injected as noted:          Injection Schedule:                                     Left SCM                     20 units at 1 site, EMG 3++   Left upper trap             20 units at 1 site, EMG 2+++    Additional upper trap site 10 units at 1 site, EMG 2+++  Left occipitalis              10 units at 1 site, EGM 2++     Right Spleni                 50 units at 1 site, EMG 3+    Right levator                20 units at 1 site,EMG 2+++    Right upper trap          20 units at 1 site, EMG 2+++     Right middle trap         20 units at 1 site, EMG 2+++     Right Occipitalis          10 units at 1 site, EMG 2+++             There were no complications.

## 2024-08-07 ENCOUNTER — OFFICE VISIT (OUTPATIENT)
Dept: NEUROLOGY | Age: 64
End: 2024-08-07

## 2024-08-07 VITALS — SYSTOLIC BLOOD PRESSURE: 118 MMHG | OXYGEN SATURATION: 94 % | HEART RATE: 95 BPM | DIASTOLIC BLOOD PRESSURE: 77 MMHG

## 2024-08-07 DIAGNOSIS — G44.229 CHRONIC TENSION-TYPE HEADACHE, NOT INTRACTABLE: ICD-10-CM

## 2024-08-07 DIAGNOSIS — G24.3 CERVICAL DYSTONIA: Primary | ICD-10-CM

## 2024-08-07 RX ORDER — PREDNISONE 10 MG/1
TABLET ORAL
Qty: 18 TABLET | Refills: 0 | Status: SHIPPED | OUTPATIENT
Start: 2024-08-07

## 2024-08-07 RX ORDER — AMITRIPTYLINE HYDROCHLORIDE 10 MG/1
10 TABLET, FILM COATED ORAL NIGHTLY
Qty: 14 TABLET | Refills: 0 | Status: SHIPPED | OUTPATIENT
Start: 2024-08-07

## 2024-09-03 ENCOUNTER — TELEPHONE (OUTPATIENT)
Dept: NEUROLOGY | Age: 64
End: 2024-09-03

## 2024-09-03 NOTE — TELEPHONE ENCOUNTER
Patient called and said she had an MRI done and she stated the results were concerning so she was asking to see about getting an appointment to discuss the results. She said she would have findings faxed over to you as well.

## 2024-09-04 ENCOUNTER — PATIENT MESSAGE (OUTPATIENT)
Dept: NEUROLOGY | Age: 64
End: 2024-09-04

## 2024-09-04 NOTE — TELEPHONE ENCOUNTER
She was treated by Neurovascular surgeon at [facility] for this and has a treatment plan in place and has been started on treatment already. This is who is managing her.

## 2024-09-04 NOTE — TELEPHONE ENCOUNTER
You see patient for botox and cervical dystonia. From what I can see, she either sees or saw in hospital [facility] neurologist, Dwayne Jones MD

## 2025-01-06 NOTE — PROGRESS NOTES
Procedure note:  Botulinum Toxin injections     Indication: cervical dystonia  The patient has continued to have a good response to botulinum therapy with improved ROM, pain, and head position. Denies side effects.   I reviewed injection pattern from last visit and no changes were indicated to pattern today.              Informed consent was obtained after the potential risks and benefits have been explained to the patient.  Potential risks include:  Pain, bruising, bleeding, infection, flu-like symptoms, over-weakening of injected or adjacent muscles and swallowing dysfunction. Patient was given the botulinum toxin medication guide according to FDA standards.         Procedure:  Using a 27 gauge 1.5 inch hollow lumen recording needle on a tuberculin syringe was used to inject bolutinum toxin in the muscles noted below, The following muscles were sampled utilizing EMG and injected as noted:          Injection Schedule:                                     Left SCM                     20 units at 1 site, EMG 3++   Left upper trap             20 units at 1 site, EMG 2+++    Additional upper trap site 10 units at 1 site, EMG 2+++  Left occipitalis              10 units at 1 site, EGM 2++     Right Spleni                 50 units at 1 site, EMG 3+    Right levator                20 units at 1 site,EMG 2+++    Right upper trap          20 units at 1 site, EMG 2+++     Right middle trap         20 units at 1 site, EMG 2+++     Right Occipitalis          10 units at 1 site, EMG 2+++             There were no complications.  The patient tolerated the procedure well.  Dilution: 1:1  Total number of units of botulinum toxin used:  180 and 20 units were wasted.

## 2025-01-08 ENCOUNTER — OFFICE VISIT (OUTPATIENT)
Dept: NEUROLOGY | Age: 65
End: 2025-01-08
Payer: MEDICARE

## 2025-01-08 VITALS — DIASTOLIC BLOOD PRESSURE: 78 MMHG | OXYGEN SATURATION: 94 % | HEART RATE: 63 BPM | SYSTOLIC BLOOD PRESSURE: 117 MMHG

## 2025-01-08 DIAGNOSIS — G44.229 CHRONIC TENSION-TYPE HEADACHE, NOT INTRACTABLE: ICD-10-CM

## 2025-01-08 DIAGNOSIS — G24.3 CERVICAL DYSTONIA: Primary | ICD-10-CM

## 2025-01-08 PROCEDURE — 64616 CHEMODENERV MUSC NECK DYSTON: CPT | Performed by: PSYCHIATRY & NEUROLOGY

## 2025-01-08 PROCEDURE — 95874 GUIDE NERV DESTR NEEDLE EMG: CPT | Performed by: PSYCHIATRY & NEUROLOGY

## 2025-04-09 NOTE — PROGRESS NOTES
[facility] NEUROLOGY  [address], [address]  [place] [address]  Phone: [phone] Fax [phone]  Dr. Davion Hannah        Patient: Shanna Carmona  Provider: Davion Hannah MD     Procedure note:  Botulinum Toxin injections     Indication: Cervical Dystonia        Subjective:      Patient presents for chemodenervation for cervical dystonia.  She is a previous patient of Dr. KUMAR and these records have been reviewed.    In summary, injections have been beneficial with improved neck range of motion and head position, and reduced pain and muscle spasm.  Denies any adverse effects with previous injections.       Past medical history, surgical history, social history, family history, medications and allergies were all reviewed and updated as appropriate.      Outpatient Encounter Medications as of 4/10/2025   Medication Sig Dispense Refill    amitriptyline (ELAVIL) 10 MG tablet Take 1 tablet by mouth nightly 14 tablet 0    predniSONE (DELTASONE) 10 MG tablet 3 for 3 days then 2 for 3 days then 1 for 3 days and stop 18 tablet 0    Onabotulinumtoxin A (BOTOX) 200 units injection Inject 200 Units into the muscle every 3 months 1 each 3    citalopram (CELEXA) 40 MG tablet TAKE 1 TABLET BY MOUTH DAILY 90 tablet 3    albuterol sulfate  (90 Base) MCG/ACT inhaler Inhale 1-2 puffs into the lungs every 6 hours as needed      nystatin (MYCOSTATIN) 689244 UNIT/GM powder Apply twice daily      ondansetron (ZOFRAN-ODT) 4 MG disintegrating tablet Take 4 mg by mouth every 8 hours as needed      pantoprazole (PROTONIX) 40 MG tablet Take 40 mg by mouth daily      [] Onabotulinumtoxin A (BOTOX) injection 200 Units        No facility-administered encounter medications on file as of 4/10/2025.           Exam:      Visit Vitals  Vitals:    04/10/25 1545   BP: 134/76   Pulse: 80     Focused Exam:   There is a tendency for rightward head turn and a leftward head tilt at rest.  No head tremor noted.  No tremor of the

## 2025-04-10 ENCOUNTER — OFFICE VISIT (OUTPATIENT)
Dept: NEUROLOGY | Age: 65
End: 2025-04-10

## 2025-04-10 VITALS
HEIGHT: 63 IN | DIASTOLIC BLOOD PRESSURE: 76 MMHG | SYSTOLIC BLOOD PRESSURE: 134 MMHG | HEART RATE: 80 BPM | BODY MASS INDEX: 41.27 KG/M2

## 2025-04-10 DIAGNOSIS — G24.3 CERVICAL DYSTONIA: Primary | ICD-10-CM

## 2025-04-25 ENCOUNTER — TELEPHONE (OUTPATIENT)
Dept: NEUROLOGY | Age: 65
End: 2025-04-25

## 2025-04-25 NOTE — TELEPHONE ENCOUNTER
Patient said at her last visit you were going to refer her to a vascular neurologist for the blood clots in her brain. Please advise.

## 2025-04-25 NOTE — TELEPHONE ENCOUNTER
Case has been discussed with Dr. 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

## 2025-04-25 NOTE — TELEPHONE ENCOUNTER
LVM for patient letting her know she is being referred to Dr Golden who is in our office and that the schedulers will be calling her to get the appointment scheduled.

## 2025-06-02 ENCOUNTER — TELEPHONE (OUTPATIENT)
Dept: NEUROLOGY | Age: 65
End: 2025-06-02

## 2025-06-02 NOTE — TELEPHONE ENCOUNTER
Patient feels like her dystonia is worsening. She is having internal tremors, her limbs are jerking and she had trouble walking yesterday.

## 2025-06-04 NOTE — TELEPHONE ENCOUNTER
Patient advised that Dr Hannah is out of the office and she will get a call back on Monday. She is okay with waiting until next week.

## 2025-06-09 NOTE — TELEPHONE ENCOUNTER
We can reassess her at her next follow-up for injections in July.    But let her know that should symptoms acutely worsen (severe headaches, vision changes, or unilateral weakness), then she may need to go to the ED - (she has a recent history of possible cerebral venous sinus thrombosis, seen at [facility]).

## 2025-07-10 ENCOUNTER — OFFICE VISIT (OUTPATIENT)
Dept: NEUROLOGY | Age: 65
End: 2025-07-10
Payer: MEDICARE

## 2025-07-10 VITALS
DIASTOLIC BLOOD PRESSURE: 79 MMHG | BODY MASS INDEX: 41.27 KG/M2 | HEIGHT: 63 IN | SYSTOLIC BLOOD PRESSURE: 123 MMHG | HEART RATE: 83 BPM

## 2025-07-10 DIAGNOSIS — G24.3 CERVICAL DYSTONIA: Primary | ICD-10-CM

## 2025-07-10 PROCEDURE — 64616 CHEMODENERV MUSC NECK DYSTON: CPT | Performed by: PSYCHIATRY & NEUROLOGY

## 2025-07-10 PROCEDURE — 1036F TOBACCO NON-USER: CPT | Performed by: PSYCHIATRY & NEUROLOGY

## 2025-07-10 PROCEDURE — 3017F COLORECTAL CA SCREEN DOC REV: CPT | Performed by: PSYCHIATRY & NEUROLOGY

## 2025-07-10 PROCEDURE — G8417 CALC BMI ABV UP PARAM F/U: HCPCS | Performed by: PSYCHIATRY & NEUROLOGY

## 2025-07-10 PROCEDURE — 99213 OFFICE O/P EST LOW 20 MIN: CPT | Performed by: PSYCHIATRY & NEUROLOGY

## 2025-07-10 PROCEDURE — G8428 CUR MEDS NOT DOCUMENT: HCPCS | Performed by: PSYCHIATRY & NEUROLOGY

## 2025-07-10 PROCEDURE — 95874 GUIDE NERV DESTR NEEDLE EMG: CPT | Performed by: PSYCHIATRY & NEUROLOGY

## 2025-07-10 RX ORDER — TOPIRAMATE 25 MG/1
25 TABLET, FILM COATED ORAL DAILY
Qty: 30 TABLET | Refills: 5 | Status: SHIPPED | OUTPATIENT
Start: 2025-07-10

## 2025-07-10 NOTE — PROGRESS NOTES
[facility] NEUROLOGY  [address], [address]  [place] [address]  Phone: [phone] Fax [phone]  Dr. Davion Hannah        Patient: Shanna Carmona  Provider: Davion Hannah MD     Procedure note:  Botulinum Toxin injections     Indication: Cervical Dystonia        Subjective:      Patient presents for chemodenervation for cervical dystonia.  She is a previous patient of Dr. KUMAR and these records have been reviewed.    In summary, injections have reported to be beneficial with improved neck range of motion and head position, and reduced pain and muscle spasm.  Denies any adverse effects with previous injections.    However patient reports that she has had less benefit with the most recent injections.  She does continue to experience some bilateral muscle spasms radiating from the neck down the shoulder.  She appears to have accompanying movements in the right upper extremity and a sensation of cramping symptoms in the right arm.      Other involuntary movements were noted today including right shoulder elevation, head turning to the right, and other stereotyped movements of the right upper extremity reaching across her body.  At rest there does not appear any resting tremor or any other obvious dystonic posturing of the extremities.    She reports being started on topiramate 25 mg daily for chronic migraine (with instructions to increase to 2 tablets daily if tolerated) but she has only been taking it as needed.  She does note some benefit with this medication, so I have suggested that we restart it daily for prevention purposes.    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  She has follow-up with Dr. Golden regarding any further management.       Past medical history, surgical history, social history, family history, medications and allergies were all reviewed and updated as